# Patient Record
Sex: FEMALE | Race: WHITE | NOT HISPANIC OR LATINO | Employment: UNEMPLOYED | URBAN - METROPOLITAN AREA
[De-identification: names, ages, dates, MRNs, and addresses within clinical notes are randomized per-mention and may not be internally consistent; named-entity substitution may affect disease eponyms.]

---

## 2017-02-28 PROBLEM — Z01.419 WELL WOMAN EXAM WITH ROUTINE GYNECOLOGICAL EXAM: Chronic | Status: ACTIVE | Noted: 2017-02-28

## 2018-07-04 ENCOUNTER — APPOINTMENT (OUTPATIENT)
Dept: GENERAL RADIOLOGY | Facility: HOSPITAL | Age: 48
End: 2018-07-04

## 2018-07-04 ENCOUNTER — HOSPITAL ENCOUNTER (INPATIENT)
Facility: HOSPITAL | Age: 48
LOS: 1 days | Discharge: HOME | End: 2018-07-05
Attending: EMERGENCY MEDICINE | Admitting: ORTHOPAEDIC SURGERY

## 2018-07-04 ENCOUNTER — ANESTHESIA EVENT (OUTPATIENT)
Dept: PERIOP | Facility: HOSPITAL | Age: 48
End: 2018-07-04

## 2018-07-04 ENCOUNTER — ANESTHESIA (OUTPATIENT)
Dept: PERIOP | Facility: HOSPITAL | Age: 48
End: 2018-07-04

## 2018-07-04 DIAGNOSIS — Z74.09 IMPAIRED MOBILITY AND ADLS: ICD-10-CM

## 2018-07-04 DIAGNOSIS — S72.001A CLOSED FRACTURE OF NECK OF RIGHT FEMUR, INITIAL ENCOUNTER (HCC): Primary | ICD-10-CM

## 2018-07-04 DIAGNOSIS — Z78.9 IMPAIRED MOBILITY AND ADLS: ICD-10-CM

## 2018-07-04 DIAGNOSIS — Z74.09 IMPAIRED FUNCTIONAL MOBILITY, BALANCE, GAIT, AND ENDURANCE: ICD-10-CM

## 2018-07-04 PROBLEM — D72.829 LEUKOCYTOSIS: Status: ACTIVE | Noted: 2018-07-04

## 2018-07-04 PROBLEM — Z72.0 TOBACCO ABUSE: Status: ACTIVE | Noted: 2018-07-04

## 2018-07-04 PROBLEM — F12.90 MARIJUANA USE: Status: ACTIVE | Noted: 2018-07-04

## 2018-07-04 LAB
ABO GROUP BLD: NORMAL
ALBUMIN SERPL-MCNC: 4.6 G/DL (ref 3.2–4.8)
ALBUMIN/GLOB SERPL: 1.8 G/DL (ref 1.5–2.5)
ALP SERPL-CCNC: 77 U/L (ref 25–100)
ALT SERPL W P-5'-P-CCNC: 15 U/L (ref 7–40)
ANION GAP SERPL CALCULATED.3IONS-SCNC: 20 MMOL/L (ref 3–11)
AST SERPL-CCNC: 27 U/L (ref 0–33)
BASOPHILS # BLD AUTO: 0.03 10*3/MM3 (ref 0–0.2)
BASOPHILS NFR BLD AUTO: 0.2 % (ref 0–1)
BILIRUB SERPL-MCNC: 0.3 MG/DL (ref 0.3–1.2)
BLD GP AB SCN SERPL QL: NEGATIVE
BUN BLD-MCNC: 12 MG/DL (ref 9–23)
BUN/CREAT SERPL: 13.8 (ref 7–25)
CALCIUM SPEC-SCNC: 9.1 MG/DL (ref 8.7–10.4)
CHLORIDE SERPL-SCNC: 97 MMOL/L (ref 99–109)
CO2 SERPL-SCNC: 22 MMOL/L (ref 20–31)
CREAT BLD-MCNC: 0.87 MG/DL (ref 0.6–1.3)
DEPRECATED RDW RBC AUTO: 43.7 FL (ref 37–54)
EOSINOPHIL # BLD AUTO: 0.14 10*3/MM3 (ref 0–0.3)
EOSINOPHIL NFR BLD AUTO: 1 % (ref 0–3)
ERYTHROCYTE [DISTWIDTH] IN BLOOD BY AUTOMATED COUNT: 12.5 % (ref 11.3–14.5)
GFR SERPL CREATININE-BSD FRML MDRD: 69 ML/MIN/1.73
GLOBULIN UR ELPH-MCNC: 2.5 GM/DL
GLUCOSE BLD-MCNC: 88 MG/DL (ref 70–100)
HCG INTACT+B SERPL-ACNC: <5 MIU/ML
HCT VFR BLD AUTO: 44.2 % (ref 34.5–44)
HGB BLD-MCNC: 15 G/DL (ref 11.5–15.5)
HOLD SPECIMEN: NORMAL
HOLD SPECIMEN: NORMAL
IMM GRANULOCYTES # BLD: 0.06 10*3/MM3 (ref 0–0.03)
IMM GRANULOCYTES NFR BLD: 0.4 % (ref 0–0.6)
LYMPHOCYTES # BLD AUTO: 3.75 10*3/MM3 (ref 0.6–4.8)
LYMPHOCYTES NFR BLD AUTO: 27.4 % (ref 24–44)
MCH RBC QN AUTO: 32.7 PG (ref 27–31)
MCHC RBC AUTO-ENTMCNC: 33.9 G/DL (ref 32–36)
MCV RBC AUTO: 96.3 FL (ref 80–99)
MONOCYTES # BLD AUTO: 0.77 10*3/MM3 (ref 0–1)
MONOCYTES NFR BLD AUTO: 5.6 % (ref 0–12)
NEUTROPHILS # BLD AUTO: 8.93 10*3/MM3 (ref 1.5–8.3)
NEUTROPHILS NFR BLD AUTO: 65.4 % (ref 41–71)
PLATELET # BLD AUTO: 210 10*3/MM3 (ref 150–450)
PMV BLD AUTO: 10.3 FL (ref 6–12)
POTASSIUM BLD-SCNC: 3.8 MMOL/L (ref 3.5–5.5)
PROT SERPL-MCNC: 7.1 G/DL (ref 5.7–8.2)
RBC # BLD AUTO: 4.59 10*6/MM3 (ref 3.89–5.14)
RH BLD: POSITIVE
SODIUM BLD-SCNC: 139 MMOL/L (ref 132–146)
T&S EXPIRATION DATE: NORMAL
WBC NRBC COR # BLD: 13.68 10*3/MM3 (ref 3.5–10.8)
WHOLE BLOOD HOLD SPECIMEN: NORMAL
WHOLE BLOOD HOLD SPECIMEN: NORMAL

## 2018-07-04 PROCEDURE — C1713 ANCHOR/SCREW BN/BN,TIS/BN: HCPCS | Performed by: ORTHOPAEDIC SURGERY

## 2018-07-04 PROCEDURE — 97116 GAIT TRAINING THERAPY: CPT

## 2018-07-04 PROCEDURE — 99253 IP/OBS CNSLTJ NEW/EST LOW 45: CPT | Performed by: ORTHOPAEDIC SURGERY

## 2018-07-04 PROCEDURE — 25010000002 ONDANSETRON PER 1 MG: Performed by: ORTHOPAEDIC SURGERY

## 2018-07-04 PROCEDURE — 76000 FLUOROSCOPY <1 HR PHYS/QHP: CPT

## 2018-07-04 PROCEDURE — 25010000002 HYDROMORPHONE PER 4 MG: Performed by: EMERGENCY MEDICINE

## 2018-07-04 PROCEDURE — 86900 BLOOD TYPING SEROLOGIC ABO: CPT | Performed by: NURSE PRACTITIONER

## 2018-07-04 PROCEDURE — 25010000002 FENTANYL CITRATE (PF) 100 MCG/2ML SOLUTION: Performed by: ANESTHESIOLOGY

## 2018-07-04 PROCEDURE — 25010000003 CEFAZOLIN IN DEXTROSE 2-4 GM/100ML-% SOLUTION: Performed by: ANESTHESIOLOGY

## 2018-07-04 PROCEDURE — 25010000002 HYDROMORPHONE PER 4 MG: Performed by: INTERNAL MEDICINE

## 2018-07-04 PROCEDURE — 84702 CHORIONIC GONADOTROPIN TEST: CPT | Performed by: ORTHOPAEDIC SURGERY

## 2018-07-04 PROCEDURE — 86901 BLOOD TYPING SEROLOGIC RH(D): CPT | Performed by: NURSE PRACTITIONER

## 2018-07-04 PROCEDURE — 99406 BEHAV CHNG SMOKING 3-10 MIN: CPT | Performed by: NURSE PRACTITIONER

## 2018-07-04 PROCEDURE — 25010000002 HYDROMORPHONE PER 4 MG: Performed by: ANESTHESIOLOGY

## 2018-07-04 PROCEDURE — 99223 1ST HOSP IP/OBS HIGH 75: CPT | Performed by: INTERNAL MEDICINE

## 2018-07-04 PROCEDURE — 85025 COMPLETE CBC W/AUTO DIFF WBC: CPT | Performed by: EMERGENCY MEDICINE

## 2018-07-04 PROCEDURE — 25010000002 ONDANSETRON PER 1 MG: Performed by: EMERGENCY MEDICINE

## 2018-07-04 PROCEDURE — 80053 COMPREHEN METABOLIC PANEL: CPT | Performed by: EMERGENCY MEDICINE

## 2018-07-04 PROCEDURE — 86850 RBC ANTIBODY SCREEN: CPT | Performed by: NURSE PRACTITIONER

## 2018-07-04 PROCEDURE — 99285 EMERGENCY DEPT VISIT HI MDM: CPT

## 2018-07-04 PROCEDURE — 0QS634Z REPOSITION RIGHT UPPER FEMUR WITH INTERNAL FIXATION DEVICE, PERCUTANEOUS APPROACH: ICD-10-PCS | Performed by: ORTHOPAEDIC SURGERY

## 2018-07-04 PROCEDURE — 72170 X-RAY EXAM OF PELVIS: CPT

## 2018-07-04 PROCEDURE — 99406 BEHAV CHNG SMOKING 3-10 MIN: CPT | Performed by: ORTHOPAEDIC SURGERY

## 2018-07-04 PROCEDURE — 27235 TREAT THIGH FRACTURE: CPT | Performed by: ORTHOPAEDIC SURGERY

## 2018-07-04 PROCEDURE — C1769 GUIDE WIRE: HCPCS | Performed by: ORTHOPAEDIC SURGERY

## 2018-07-04 PROCEDURE — 71045 X-RAY EXAM CHEST 1 VIEW: CPT

## 2018-07-04 PROCEDURE — 25010000002 DIPHENHYDRAMINE PER 50 MG: Performed by: HOSPITALIST

## 2018-07-04 PROCEDURE — 97161 PT EVAL LOW COMPLEX 20 MIN: CPT

## 2018-07-04 PROCEDURE — 25010000002 PROPOFOL 10 MG/ML EMULSION: Performed by: ANESTHESIOLOGY

## 2018-07-04 PROCEDURE — 73552 X-RAY EXAM OF FEMUR 2/>: CPT

## 2018-07-04 PROCEDURE — 25010000002 ENOXAPARIN PER 10 MG: Performed by: ORTHOPAEDIC SURGERY

## 2018-07-04 DEVICE — SCRW CANN 16THRD 6.5X75MM: Type: IMPLANTABLE DEVICE | Status: FUNCTIONAL

## 2018-07-04 DEVICE — SCRW CANN 16THRD 6.5X80MM: Type: IMPLANTABLE DEVICE | Status: FUNCTIONAL

## 2018-07-04 RX ORDER — HYDROCODONE BITARTRATE AND ACETAMINOPHEN 5; 325 MG/1; MG/1
1 TABLET ORAL EVERY 4 HOURS PRN
Status: DISCONTINUED | OUTPATIENT
Start: 2018-07-04 | End: 2018-07-05

## 2018-07-04 RX ORDER — SODIUM CHLORIDE 9 MG/ML
75 INJECTION, SOLUTION INTRAVENOUS CONTINUOUS
Status: DISCONTINUED | OUTPATIENT
Start: 2018-07-04 | End: 2018-07-04 | Stop reason: SDUPTHER

## 2018-07-04 RX ORDER — NALOXONE HCL 0.4 MG/ML
0.4 VIAL (ML) INJECTION
Status: DISCONTINUED | OUTPATIENT
Start: 2018-07-04 | End: 2018-07-05 | Stop reason: HOSPADM

## 2018-07-04 RX ORDER — FAMOTIDINE 20 MG/1
20 TABLET, FILM COATED ORAL ONCE
Status: DISCONTINUED | OUTPATIENT
Start: 2018-07-04 | End: 2018-07-04 | Stop reason: SDUPTHER

## 2018-07-04 RX ORDER — FENTANYL CITRATE 50 UG/ML
50 INJECTION, SOLUTION INTRAMUSCULAR; INTRAVENOUS
Status: DISCONTINUED | OUTPATIENT
Start: 2018-07-04 | End: 2018-07-04 | Stop reason: HOSPADM

## 2018-07-04 RX ORDER — NICOTINE 21 MG/24HR
1 PATCH, TRANSDERMAL 24 HOURS TRANSDERMAL EVERY 24 HOURS
Status: DISCONTINUED | OUTPATIENT
Start: 2018-07-04 | End: 2018-07-05 | Stop reason: HOSPADM

## 2018-07-04 RX ORDER — SODIUM CHLORIDE 9 MG/ML
120 INJECTION, SOLUTION INTRAVENOUS CONTINUOUS
Status: DISCONTINUED | OUTPATIENT
Start: 2018-07-04 | End: 2018-07-05 | Stop reason: HOSPADM

## 2018-07-04 RX ORDER — DOCUSATE SODIUM 100 MG/1
100 CAPSULE, LIQUID FILLED ORAL 2 TIMES DAILY
Status: DISCONTINUED | OUTPATIENT
Start: 2018-07-04 | End: 2018-07-05 | Stop reason: HOSPADM

## 2018-07-04 RX ORDER — SODIUM CHLORIDE 0.9 % (FLUSH) 0.9 %
10 SYRINGE (ML) INJECTION AS NEEDED
Status: DISCONTINUED | OUTPATIENT
Start: 2018-07-04 | End: 2018-07-04

## 2018-07-04 RX ORDER — ONDANSETRON 2 MG/ML
4 INJECTION INTRAMUSCULAR; INTRAVENOUS EVERY 6 HOURS PRN
Status: DISCONTINUED | OUTPATIENT
Start: 2018-07-04 | End: 2018-07-05 | Stop reason: HOSPADM

## 2018-07-04 RX ORDER — DIPHENHYDRAMINE HYDROCHLORIDE 50 MG/ML
25 INJECTION INTRAMUSCULAR; INTRAVENOUS EVERY 6 HOURS PRN
Status: DISCONTINUED | OUTPATIENT
Start: 2018-07-04 | End: 2018-07-05 | Stop reason: HOSPADM

## 2018-07-04 RX ORDER — LIDOCAINE HYDROCHLORIDE 10 MG/ML
0.5 INJECTION, SOLUTION EPIDURAL; INFILTRATION; INTRACAUDAL; PERINEURAL ONCE AS NEEDED
Status: DISCONTINUED | OUTPATIENT
Start: 2018-07-04 | End: 2018-07-05 | Stop reason: HOSPADM

## 2018-07-04 RX ORDER — ATRACURIUM BESYLATE 10 MG/ML
INJECTION, SOLUTION INTRAVENOUS AS NEEDED
Status: DISCONTINUED | OUTPATIENT
Start: 2018-07-04 | End: 2018-07-04 | Stop reason: SURG

## 2018-07-04 RX ORDER — BISACODYL 5 MG/1
5 TABLET, DELAYED RELEASE ORAL DAILY PRN
Status: DISCONTINUED | OUTPATIENT
Start: 2018-07-04 | End: 2018-07-05 | Stop reason: HOSPADM

## 2018-07-04 RX ORDER — MAGNESIUM HYDROXIDE 1200 MG/15ML
LIQUID ORAL AS NEEDED
Status: DISCONTINUED | OUTPATIENT
Start: 2018-07-04 | End: 2018-07-04 | Stop reason: HOSPADM

## 2018-07-04 RX ORDER — ONDANSETRON 2 MG/ML
4 INJECTION INTRAMUSCULAR; INTRAVENOUS ONCE
Status: COMPLETED | OUTPATIENT
Start: 2018-07-04 | End: 2018-07-04

## 2018-07-04 RX ORDER — SODIUM CHLORIDE, SODIUM LACTATE, POTASSIUM CHLORIDE, CALCIUM CHLORIDE 600; 310; 30; 20 MG/100ML; MG/100ML; MG/100ML; MG/100ML
INJECTION, SOLUTION INTRAVENOUS CONTINUOUS PRN
Status: DISCONTINUED | OUTPATIENT
Start: 2018-07-04 | End: 2018-07-04 | Stop reason: SURG

## 2018-07-04 RX ORDER — CEFAZOLIN SODIUM 2 G/100ML
INJECTION, SOLUTION INTRAVENOUS AS NEEDED
Status: DISCONTINUED | OUTPATIENT
Start: 2018-07-04 | End: 2018-07-04 | Stop reason: SURG

## 2018-07-04 RX ORDER — FAMOTIDINE 10 MG/ML
20 INJECTION, SOLUTION INTRAVENOUS ONCE
Status: DISCONTINUED | OUTPATIENT
Start: 2018-07-04 | End: 2018-07-04 | Stop reason: SDUPTHER

## 2018-07-04 RX ORDER — PROPOFOL 10 MG/ML
VIAL (ML) INTRAVENOUS AS NEEDED
Status: DISCONTINUED | OUTPATIENT
Start: 2018-07-04 | End: 2018-07-04 | Stop reason: SURG

## 2018-07-04 RX ORDER — SODIUM CHLORIDE 0.9 % (FLUSH) 0.9 %
1-10 SYRINGE (ML) INJECTION AS NEEDED
Status: DISCONTINUED | OUTPATIENT
Start: 2018-07-04 | End: 2018-07-04 | Stop reason: SDUPTHER

## 2018-07-04 RX ORDER — SODIUM CHLORIDE 0.9 % (FLUSH) 0.9 %
1-10 SYRINGE (ML) INJECTION AS NEEDED
Status: DISCONTINUED | OUTPATIENT
Start: 2018-07-04 | End: 2018-07-04

## 2018-07-04 RX ORDER — SODIUM CHLORIDE, SODIUM LACTATE, POTASSIUM CHLORIDE, CALCIUM CHLORIDE 600; 310; 30; 20 MG/100ML; MG/100ML; MG/100ML; MG/100ML
9 INJECTION, SOLUTION INTRAVENOUS CONTINUOUS
Status: DISCONTINUED | OUTPATIENT
Start: 2018-07-04 | End: 2018-07-04 | Stop reason: SDUPTHER

## 2018-07-04 RX ORDER — BISACODYL 10 MG
10 SUPPOSITORY, RECTAL RECTAL DAILY PRN
Status: DISCONTINUED | OUTPATIENT
Start: 2018-07-04 | End: 2018-07-05 | Stop reason: HOSPADM

## 2018-07-04 RX ORDER — SODIUM CHLORIDE 0.9 % (FLUSH) 0.9 %
10 SYRINGE (ML) INJECTION AS NEEDED
Status: DISCONTINUED | OUTPATIENT
Start: 2018-07-04 | End: 2018-07-04 | Stop reason: SDUPTHER

## 2018-07-04 RX ORDER — MORPHINE SULFATE 4 MG/ML
1 INJECTION, SOLUTION INTRAMUSCULAR; INTRAVENOUS EVERY 4 HOURS PRN
Status: DISCONTINUED | OUTPATIENT
Start: 2018-07-04 | End: 2018-07-05 | Stop reason: HOSPADM

## 2018-07-04 RX ORDER — FENTANYL CITRATE 50 UG/ML
INJECTION, SOLUTION INTRAMUSCULAR; INTRAVENOUS AS NEEDED
Status: DISCONTINUED | OUTPATIENT
Start: 2018-07-04 | End: 2018-07-04 | Stop reason: SURG

## 2018-07-04 RX ORDER — ACETAMINOPHEN 325 MG/1
650 TABLET ORAL EVERY 6 HOURS PRN
Status: DISCONTINUED | OUTPATIENT
Start: 2018-07-04 | End: 2018-07-05 | Stop reason: HOSPADM

## 2018-07-04 RX ORDER — SODIUM CHLORIDE 9 MG/ML
125 INJECTION, SOLUTION INTRAVENOUS CONTINUOUS
Status: DISCONTINUED | OUTPATIENT
Start: 2018-07-04 | End: 2018-07-04 | Stop reason: SDUPTHER

## 2018-07-04 RX ADMIN — SODIUM CHLORIDE, POTASSIUM CHLORIDE, SODIUM LACTATE AND CALCIUM CHLORIDE: 600; 310; 30; 20 INJECTION, SOLUTION INTRAVENOUS at 13:13

## 2018-07-04 RX ADMIN — HYDROMORPHONE HYDROCHLORIDE 0.5 MG: 1 INJECTION, SOLUTION INTRAMUSCULAR; INTRAVENOUS; SUBCUTANEOUS at 09:42

## 2018-07-04 RX ADMIN — CEFAZOLIN SODIUM 2 G: 2 INJECTION, SOLUTION INTRAVENOUS at 12:24

## 2018-07-04 RX ADMIN — DIPHENHYDRAMINE HYDROCHLORIDE 25 MG: 50 INJECTION INTRAMUSCULAR; INTRAVENOUS at 21:12

## 2018-07-04 RX ADMIN — HYDROMORPHONE HYDROCHLORIDE 0.5 MG: 1 INJECTION, SOLUTION INTRAMUSCULAR; INTRAVENOUS; SUBCUTANEOUS at 13:20

## 2018-07-04 RX ADMIN — HYDROMORPHONE HYDROCHLORIDE 0.5 MG: 1 INJECTION, SOLUTION INTRAMUSCULAR; INTRAVENOUS; SUBCUTANEOUS at 23:30

## 2018-07-04 RX ADMIN — SODIUM CHLORIDE 120 ML/HR: 9 INJECTION, SOLUTION INTRAVENOUS at 14:16

## 2018-07-04 RX ADMIN — ONDANSETRON 4 MG: 2 INJECTION, SOLUTION INTRAMUSCULAR; INTRAVENOUS at 01:36

## 2018-07-04 RX ADMIN — HYDROCODONE BITARTRATE AND ACETAMINOPHEN 1 TABLET: 5; 325 TABLET ORAL at 21:48

## 2018-07-04 RX ADMIN — SODIUM CHLORIDE 75 ML/HR: 9 INJECTION, SOLUTION INTRAVENOUS at 05:47

## 2018-07-04 RX ADMIN — HYDROCODONE BITARTRATE AND ACETAMINOPHEN 1 TABLET: 5; 325 TABLET ORAL at 18:02

## 2018-07-04 RX ADMIN — NICOTINE 1 PATCH: 21 PATCH, EXTENDED RELEASE TRANSDERMAL at 05:46

## 2018-07-04 RX ADMIN — ONDANSETRON 4 MG: 2 INJECTION INTRAMUSCULAR; INTRAVENOUS at 14:18

## 2018-07-04 RX ADMIN — DOCUSATE SODIUM 100 MG: 100 CAPSULE, LIQUID FILLED ORAL at 21:48

## 2018-07-04 RX ADMIN — FENTANYL CITRATE 50 MCG: 50 INJECTION, SOLUTION INTRAMUSCULAR; INTRAVENOUS at 13:30

## 2018-07-04 RX ADMIN — FENTANYL CITRATE 50 MCG: 50 INJECTION, SOLUTION INTRAMUSCULAR; INTRAVENOUS at 13:35

## 2018-07-04 RX ADMIN — ATRACURIUM BESYLATE 40 MG: 10 INJECTION, SOLUTION INTRAVENOUS at 12:16

## 2018-07-04 RX ADMIN — PROPOFOL 200 MG: 10 INJECTION, EMULSION INTRAVENOUS at 12:16

## 2018-07-04 RX ADMIN — ONDANSETRON 4 MG: 2 INJECTION INTRAMUSCULAR; INTRAVENOUS at 21:48

## 2018-07-04 RX ADMIN — HYDROMORPHONE HYDROCHLORIDE 1 MG: 1 INJECTION, SOLUTION INTRAMUSCULAR; INTRAVENOUS; SUBCUTANEOUS at 04:42

## 2018-07-04 RX ADMIN — HYDROMORPHONE HYDROCHLORIDE 1 MG: 1 INJECTION, SOLUTION INTRAMUSCULAR; INTRAVENOUS; SUBCUTANEOUS at 01:36

## 2018-07-04 RX ADMIN — DIPHENHYDRAMINE HYDROCHLORIDE 25 MG: 50 INJECTION INTRAMUSCULAR; INTRAVENOUS at 15:03

## 2018-07-04 RX ADMIN — DIPHENHYDRAMINE HYDROCHLORIDE 25 MG: 50 INJECTION INTRAMUSCULAR; INTRAVENOUS at 08:11

## 2018-07-04 RX ADMIN — HYDROMORPHONE HYDROCHLORIDE 0.5 MG: 1 INJECTION, SOLUTION INTRAMUSCULAR; INTRAVENOUS; SUBCUTANEOUS at 13:25

## 2018-07-04 RX ADMIN — HYDROCODONE BITARTRATE AND ACETAMINOPHEN 1 TABLET: 5; 325 TABLET ORAL at 14:16

## 2018-07-04 RX ADMIN — FENTANYL CITRATE 250 MCG: 50 INJECTION, SOLUTION INTRAMUSCULAR; INTRAVENOUS at 12:16

## 2018-07-04 RX ADMIN — ENOXAPARIN SODIUM 40 MG: 40 INJECTION SUBCUTANEOUS at 17:23

## 2018-07-04 NOTE — ANESTHESIA PROCEDURE NOTES
Airway  Urgency: elective    Airway not difficult    General Information and Staff    Patient location during procedure: OR  Anesthesiologist: STEVEN LI    Indications and Patient Condition  Indications for airway management: airway protection    Preoxygenated: yes  MILS not maintained throughout  Mask difficulty assessment: 1 - vent by mask    Final Airway Details  Final airway type: endotracheal airway      Successful airway: ETT  Cuffed: yes   Successful intubation technique: direct laryngoscopy  Endotracheal tube insertion site: oral  Blade: Yasmany  Blade size: #3  ETT size: 6.5 mm  Cormack-Lehane Classification: grade I - full view of glottis  Placement verified by: chest auscultation and capnometry   Measured from: lips  ETT to lips (cm): 20  Number of attempts at approach: 1    Additional Comments  Negative epigastric sounds, Breath sound equal bilaterally with symmetric chest rise and fall

## 2018-07-04 NOTE — ANESTHESIA POSTPROCEDURE EVALUATION
Patient: Anthony Chau    Procedure Summary     Date:  07/04/18 Room / [facility]:   [facility]    Anesthesia Start:  1210 Anesthesia Stop:      Procedure:  HIP TROCANTERIC NAILING WITH INTRAMEDULLARY HIP SCREW (Right Hip) Diagnosis:      Surgeon:  Alejandro Hernandez MD Provider:  Iron Martin MD    Anesthesia Type:  general ASA Status:  2 - Emergent          Anesthesia Type: general  Last vitals  BP   139/71 (07/04/18 0736)   Temp   98.3 °F (36.8 °C) (07/04/18 0736)   Pulse   73 (07/04/18 1154)   Resp   15 (07/04/18 1154)     SpO2   99 % (07/04/18 1154)     Post Anesthesia Care and Evaluation    Patient location during evaluation: PACU  Patient participation: complete - patient participated  Level of consciousness: awake and alert  Pain score: 0  Pain management: adequate  Airway patency: patent  Anesthetic complications: No anesthetic complications  PONV Status: none  Cardiovascular status: hemodynamically stable and acceptable  Respiratory status: nonlabored ventilation, acceptable and nasal cannula  Hydration status: acceptable

## 2018-07-04 NOTE — ANESTHESIA PREPROCEDURE EVALUATION
Anesthesia Evaluation                  Airway   Mallampati: I  TM distance: >3 FB  Neck ROM: full  No difficulty expected  Dental      Pulmonary    (+) a smoker,   Cardiovascular         Neuro/Psych  GI/Hepatic/Renal/Endo      Musculoskeletal     Abdominal    Substance History   (+) drug use     OB/GYN          Other                        Anesthesia Plan    ASA 2 - emergent     general     intravenous induction   Anesthetic plan and risks discussed with patient.

## 2018-07-05 VITALS
TEMPERATURE: 97.6 F | WEIGHT: 120 LBS | BODY MASS INDEX: 22.08 KG/M2 | HEART RATE: 89 BPM | SYSTOLIC BLOOD PRESSURE: 157 MMHG | HEIGHT: 62 IN | OXYGEN SATURATION: 98 % | DIASTOLIC BLOOD PRESSURE: 83 MMHG | RESPIRATION RATE: 14 BRPM

## 2018-07-05 LAB
ANION GAP SERPL CALCULATED.3IONS-SCNC: 7 MMOL/L (ref 3–11)
BASOPHILS # BLD AUTO: 0.02 10*3/MM3 (ref 0–0.2)
BASOPHILS NFR BLD AUTO: 0.2 % (ref 0–1)
BUN BLD-MCNC: 9 MG/DL (ref 9–23)
BUN/CREAT SERPL: 11 (ref 7–25)
CALCIUM SPEC-SCNC: 8.2 MG/DL (ref 8.7–10.4)
CHLORIDE SERPL-SCNC: 106 MMOL/L (ref 99–109)
CO2 SERPL-SCNC: 27 MMOL/L (ref 20–31)
CREAT BLD-MCNC: 0.82 MG/DL (ref 0.6–1.3)
DEPRECATED RDW RBC AUTO: 43.9 FL (ref 37–54)
EOSINOPHIL # BLD AUTO: 0.06 10*3/MM3 (ref 0–0.3)
EOSINOPHIL NFR BLD AUTO: 0.6 % (ref 0–3)
ERYTHROCYTE [DISTWIDTH] IN BLOOD BY AUTOMATED COUNT: 12.5 % (ref 11.3–14.5)
GFR SERPL CREATININE-BSD FRML MDRD: 74 ML/MIN/1.73
GLUCOSE BLD-MCNC: 98 MG/DL (ref 70–100)
HCT VFR BLD AUTO: 39.7 % (ref 34.5–44)
HGB BLD-MCNC: 13.4 G/DL (ref 11.5–15.5)
IMM GRANULOCYTES # BLD: 0.02 10*3/MM3 (ref 0–0.03)
IMM GRANULOCYTES NFR BLD: 0.2 % (ref 0–0.6)
LYMPHOCYTES # BLD AUTO: 3.02 10*3/MM3 (ref 0.6–4.8)
LYMPHOCYTES NFR BLD AUTO: 28.8 % (ref 24–44)
MCH RBC QN AUTO: 32.6 PG (ref 27–31)
MCHC RBC AUTO-ENTMCNC: 33.8 G/DL (ref 32–36)
MCV RBC AUTO: 96.6 FL (ref 80–99)
MONOCYTES # BLD AUTO: 0.89 10*3/MM3 (ref 0–1)
MONOCYTES NFR BLD AUTO: 8.5 % (ref 0–12)
NEUTROPHILS # BLD AUTO: 6.46 10*3/MM3 (ref 1.5–8.3)
NEUTROPHILS NFR BLD AUTO: 61.7 % (ref 41–71)
PLATELET # BLD AUTO: 166 10*3/MM3 (ref 150–450)
PMV BLD AUTO: 10.7 FL (ref 6–12)
POTASSIUM BLD-SCNC: 3.7 MMOL/L (ref 3.5–5.5)
RBC # BLD AUTO: 4.11 10*6/MM3 (ref 3.89–5.14)
SODIUM BLD-SCNC: 140 MMOL/L (ref 132–146)
WBC NRBC COR # BLD: 10.47 10*3/MM3 (ref 3.5–10.8)

## 2018-07-05 PROCEDURE — 97535 SELF CARE MNGMENT TRAINING: CPT | Performed by: OCCUPATIONAL THERAPIST

## 2018-07-05 PROCEDURE — 99024 POSTOP FOLLOW-UP VISIT: CPT | Performed by: ORTHOPAEDIC SURGERY

## 2018-07-05 PROCEDURE — 97116 GAIT TRAINING THERAPY: CPT

## 2018-07-05 PROCEDURE — 25010000002 ENOXAPARIN PER 10 MG: Performed by: ORTHOPAEDIC SURGERY

## 2018-07-05 PROCEDURE — 25010000002 HYDROMORPHONE PER 4 MG: Performed by: INTERNAL MEDICINE

## 2018-07-05 PROCEDURE — 97165 OT EVAL LOW COMPLEX 30 MIN: CPT | Performed by: OCCUPATIONAL THERAPIST

## 2018-07-05 PROCEDURE — 25010000002 DIPHENHYDRAMINE PER 50 MG: Performed by: HOSPITALIST

## 2018-07-05 PROCEDURE — 85025 COMPLETE CBC W/AUTO DIFF WBC: CPT | Performed by: ORTHOPAEDIC SURGERY

## 2018-07-05 PROCEDURE — 99239 HOSP IP/OBS DSCHRG MGMT >30: CPT | Performed by: HOSPITALIST

## 2018-07-05 PROCEDURE — 97110 THERAPEUTIC EXERCISES: CPT

## 2018-07-05 PROCEDURE — 80048 BASIC METABOLIC PNL TOTAL CA: CPT | Performed by: HOSPITALIST

## 2018-07-05 PROCEDURE — 25010000002 ONDANSETRON PER 1 MG: Performed by: ORTHOPAEDIC SURGERY

## 2018-07-05 RX ORDER — OXYCODONE HYDROCHLORIDE AND ACETAMINOPHEN 5; 325 MG/1; MG/1
1 TABLET ORAL EVERY 4 HOURS PRN
Qty: 18 TABLET | Refills: 0 | Status: SHIPPED | OUTPATIENT
Start: 2018-07-05 | End: 2018-07-08

## 2018-07-05 RX ORDER — ASPIRIN 325 MG
325 TABLET, DELAYED RELEASE (ENTERIC COATED) ORAL DAILY
Qty: 28 TABLET | Refills: 0 | Status: SHIPPED | OUTPATIENT
Start: 2018-07-20 | End: 2018-08-10 | Stop reason: SDUPTHER

## 2018-07-05 RX ORDER — OXYCODONE HYDROCHLORIDE AND ACETAMINOPHEN 5; 325 MG/1; MG/1
1 TABLET ORAL EVERY 4 HOURS PRN
Status: DISCONTINUED | OUTPATIENT
Start: 2018-07-05 | End: 2018-07-05 | Stop reason: HOSPADM

## 2018-07-05 RX ADMIN — DOCUSATE SODIUM 100 MG: 100 CAPSULE, LIQUID FILLED ORAL at 09:37

## 2018-07-05 RX ADMIN — DIPHENHYDRAMINE HYDROCHLORIDE 25 MG: 50 INJECTION INTRAMUSCULAR; INTRAVENOUS at 05:31

## 2018-07-05 RX ADMIN — HYDROCODONE BITARTRATE AND ACETAMINOPHEN 1 TABLET: 5; 325 TABLET ORAL at 06:50

## 2018-07-05 RX ADMIN — HYDROMORPHONE HYDROCHLORIDE 0.5 MG: 1 INJECTION, SOLUTION INTRAMUSCULAR; INTRAVENOUS; SUBCUTANEOUS at 05:30

## 2018-07-05 RX ADMIN — HYDROCODONE BITARTRATE AND ACETAMINOPHEN 1 TABLET: 5; 325 TABLET ORAL at 01:53

## 2018-07-05 RX ADMIN — ENOXAPARIN SODIUM 40 MG: 40 INJECTION SUBCUTANEOUS at 05:31

## 2018-07-05 RX ADMIN — ACETAMINOPHEN 650 MG: 325 TABLET, FILM COATED ORAL at 06:50

## 2018-07-05 RX ADMIN — ONDANSETRON 4 MG: 2 INJECTION INTRAMUSCULAR; INTRAVENOUS at 05:31

## 2018-07-05 RX ADMIN — NICOTINE 1 PATCH: 21 PATCH, EXTENDED RELEASE TRANSDERMAL at 05:32

## 2018-07-05 RX ADMIN — OXYCODONE HYDROCHLORIDE AND ACETAMINOPHEN 1 TABLET: 5; 325 TABLET ORAL at 13:34

## 2018-07-09 ENCOUNTER — TELEPHONE (OUTPATIENT)
Dept: ORTHOPEDIC SURGERY | Facility: CLINIC | Age: 48
End: 2018-07-09

## 2018-07-09 ENCOUNTER — TRANSITIONAL CARE MANAGEMENT TELEPHONE ENCOUNTER (OUTPATIENT)
Dept: FAMILY MEDICINE CLINIC | Facility: CLINIC | Age: 48
End: 2018-07-09

## 2018-07-09 ENCOUNTER — APPOINTMENT (OUTPATIENT)
Dept: PHYSICAL THERAPY | Facility: HOSPITAL | Age: 48
End: 2018-07-09

## 2018-07-09 NOTE — OUTREACH NOTE
The patient was discharged from [facility] 7/5/18 and has a New Patient hospital follow up scheduled with a new PCP Hollie Chapa which is within 2 business days of hospital discharge.

## 2018-07-10 ENCOUNTER — OFFICE VISIT (OUTPATIENT)
Dept: FAMILY MEDICINE CLINIC | Facility: CLINIC | Age: 48
End: 2018-07-10

## 2018-07-10 VITALS
DIASTOLIC BLOOD PRESSURE: 94 MMHG | HEART RATE: 92 BPM | OXYGEN SATURATION: 97 % | RESPIRATION RATE: 16 BRPM | TEMPERATURE: 98.7 F | SYSTOLIC BLOOD PRESSURE: 136 MMHG

## 2018-07-10 DIAGNOSIS — F41.9 ANXIETY: ICD-10-CM

## 2018-07-10 DIAGNOSIS — F17.210 CIGARETTE NICOTINE DEPENDENCE WITHOUT COMPLICATION: ICD-10-CM

## 2018-07-10 DIAGNOSIS — Z86.718 H/O BLOOD CLOTS: ICD-10-CM

## 2018-07-10 DIAGNOSIS — S72.001S CLOSED FRACTURE OF NECK OF RIGHT FEMUR, SEQUELA: Primary | ICD-10-CM

## 2018-07-10 PROCEDURE — 99496 TRANSJ CARE MGMT HIGH F2F 7D: CPT | Performed by: NURSE PRACTITIONER

## 2018-07-10 RX ORDER — NICOTINE 21 MG/24HR
1 PATCH, TRANSDERMAL 24 HOURS TRANSDERMAL EVERY 24 HOURS
Qty: 30 PATCH | Refills: 1 | Status: SHIPPED | OUTPATIENT
Start: 2018-07-10 | End: 2019-03-26

## 2018-07-10 NOTE — PROGRESS NOTES
Transitional Care Follow Up Visit  Subjective     Anthony Chau is a 48 y.o. female who presents for a transitional care management visit.    Within 48 business hours after discharge our office contacted her via telephone to coordinate her care and needs.      I reviewed and discussed the details of that call along with the discharge summary, hospital problems, inpatient lab results, inpatient diagnostic studies, and consultation reports with Anthony.     Current outpatient and discharge medications have been reconciled for the patient.    Date of TCM Phone Call 7/9/2018   [facility]   Date of Admission 7/4/2018   Date of Discharge 7/5/2018   Discharge Disposition Home or Self Care     Risk for Readmission (LACE) Score: 4 (7/5/2018  6:00 AM)    History of Present Illness   Patient is here for SUZY visit and to establish care. Has not had a PCP in a while, she was seeing someone at [facility].    Course During Hospital Stay:  Patient was admitted after a fall that resulted in a right femur fracture, Dr. Hernandez, orthopedic surgeon performed surgery with right hip pinning.   Patient discharged 7/5/18. States she will start PT tomorrow. She is still doing Lovenox injections, has not been wearing the Compression stockings.   Is using her walker , had first bowel movement yesterday, is taking her stool softener. Is using IS some. Will see ortho in 10 days. Patient is a smoker, but is interested in trying patches.   The following portions of the patient's history were reviewed and updated as appropriate: allergies, current medications, past family history, past medical history, past social history, past surgical history and problem list.    Review of Systems   Constitutional: Positive for activity change. Negative for chills and fever.   HENT: Negative for congestion.    Respiratory: Positive for cough and shortness of breath (with anxiety). Negative for wheezing.    Cardiovascular: Positive  for palpitations (with anxiety). Negative for chest pain.   Gastrointestinal: Positive for constipation. Negative for diarrhea.   Musculoskeletal: Positive for arthralgias and myalgias. Negative for gait problem and joint swelling.   Skin: Negative.    Neurological: Negative for weakness and numbness.   Psychiatric/Behavioral: The patient is nervous/anxious.        Objective   Physical Exam   Constitutional: She appears well-developed and well-nourished. No distress.   HENT:   Head: Normocephalic and atraumatic.   Right Ear: External ear normal.   Left Ear: External ear normal.   Cardiovascular: Normal rate, regular rhythm and normal heart sounds.    No murmur heard.  Pulmonary/Chest: Effort normal and breath sounds normal. No respiratory distress. She has no wheezes. She has no rales. She exhibits no tenderness.   Abdominal: Soft. She exhibits no distension.   Musculoskeletal: She exhibits tenderness (right hip/lateral thigh).   Skin: Skin is warm and dry. Capillary refill takes less than 2 seconds. She is not diaphoretic.   Bruising, slight edema, no drainage   Psychiatric: She has a normal mood and affect. Her behavior is normal. Judgment and thought content normal.   Vitals reviewed.      Assessment/Plan   Anthony was seen today for hospital follow-up.    Diagnoses and all orders for this visit:    Closed fracture of neck of right femur, sequela  -     DEXA Bone Density Axial; Future    Cigarette nicotine dependence without complication  -     nicotine (NICODERM CQ) 21 MG/24HR patch; Place 1 patch on the skin Daily.  -     DEXA Bone Density Axial; Future    Anxiety    H/O blood clots      Follow up with orthopedic as scheduled, discussed signs and symptoms of infection to watch for. Encouraged use of walker, non weight bearing as instructed. DEXA scan ordered to evaluate for osteoporosis.  Risks/benefits and potential side effects of various smoking cessation treatment options reviewed with patient.  Smoking  "cessation support options also reviewed with patient.  \"Beat the Pack\" brochure provided and reviewed with patient.  Patient voiced understanding and wishes to proceed with nicotine patches.  A total of 4 minutes dedicated to smoking cessation counseling during this visit. Discussed the importance of smoking cessation for adequate bone healing.   Patient to follow up for anxiety issues, stable.  Continue lovenox for clot prevention, wear compression stockings during the day. Participate in physical therapy.  Patient was encouraged to keep me informed of any acute changes, lack of improvement, or any new concerning symptoms.Patient voiced understanding of all instructions and denied further questions.             "

## 2018-07-10 NOTE — PATIENT INSTRUCTIONS
Preventing Constipation After Surgery  Constipation is when a person has fewer than 3 bowel movements a week; has difficulty having a bowel movement; or has stools that are dry, hard, or larger than normal. Many things can make constipation likely after surgery. They include:  · Medicines, especially numbing medicines (anesthetics) and very strong pain medicines called narcotics.  · Feeling stressed because of the surgery.  · Eating different foods than normal.  · Being less active.    Symptoms of constipation include:  · Having fewer than 3 bowel movements a week.  · Straining to have a bowel movement.  · Having hard, dry, or larger-than-normal stools.  · Feeling full or bloated.  · Having pain in the lower abdomen.  · Not feeling relief after having a bowel movement.    Follow these instructions at home:  Diet  · Eat foods that have a lot of fiber. These include fruits, vegetables, whole grains, and beans. Limit foods high in fat and processed sugars. These include french fries, hamburgers, cookies, and candy.  · Take a fiber supplement as directed. If you are not taking a fiber supplement and think that you are not getting enough fiber from foods, talk to your health care provider about adding a fiber supplement to your diet.  · Drink clear fluids, especially water. Avoid drinking alcohol, caffeine, and soda. These can make constipation worse.  · Drink enough fluids to keep your urine clear or pale yellow.  Activity  · After surgery, return to your normal activities slowly or when your health care provider says it is okay.  · Start walking as soon as you can. Try to go a little farther each day.  · Once your health care provider approves, do some sort of regular exercise. This helps prevent constipation.  Bowel Movements  · Go to the restroom when you have the urge to go. Do not hold it in.  · Try drinking something hot to get a bowel movement started.  · Keep track of how often you use the restroom. If you miss  2-3 bowel movements, talk to your health care provider about medicines that prevent constipation. Your health care provider may suggest a stool softener, laxative, or fiber supplement.  · Only take over-the-counter or prescription medicines as directed by your health care provider.  · Do not take other medicines without talking to your health care provider first. If you become constipated and take a medicine to make you have a bowel movement, the problem may get worse. Other kinds of medicine can also make the problem worse.  Contact a health care provider if:  · You used stool softeners or laxatives and still have not had a bowel movement within 24-48 hours after using them.  · You have not had a bowel movement in 3 days.  Get help right away if:  · Your constipation lasts for more than 4 days or gets worse.  · You have bright red blood in your stool.  · You have abdominal or rectal pain.  · You have very bad cramping.  · You have thin, pencil-like stools.  · You have unexplained weight loss.  · You have a fever or persistent symptoms for more than 2-3 days.  · You have a fever and your symptoms suddenly get worse.  This information is not intended to replace advice given to you by your health care provider. Make sure you discuss any questions you have with your health care provider.  Document Released: 04/14/2014 Document Revised: 05/25/2017 Document Reviewed: 01/31/2014  [employer] Interactive Patient Education © 2018 [employer]    For more information:    Quit Now [place]  1-800-QUIT-NOW  https://[place].quitlogix.org/en-[place]/  Steps to Quit Smoking  Smoking tobacco can be harmful to your health and can affect almost every organ in your body. Smoking puts you, and those around you, at risk for developing many serious chronic diseases. Quitting smoking is difficult, but it is one of the best things that you can do for your health. It is never too late to quit.  What are the benefits of quitting smoking?  When  you quit smoking, you lower your risk of developing serious diseases and conditions, such as:  · Lung cancer or lung disease, such as COPD.  · Heart disease.  · Stroke.  · Heart attack.  · Infertility.  · Osteoporosis and bone fractures.  Additionally, symptoms such as coughing, wheezing, and shortness of breath may get better when you quit. You may also find that you get sick less often because your body is stronger at fighting off colds and infections. If you are pregnant, quitting smoking can help to reduce your chances of having a baby of low birth weight.  How do I get ready to quit?  When you decide to quit smoking, create a plan to make sure that you are successful. Before you quit:  · Pick a date to quit. Set a date within the next two weeks to give you time to prepare.  · Write down the reasons why you are quitting. Keep this list in places where you will see it often, such as on your bathroom mirror or in your car or wallet.  · Identify the people, places, things, and activities that make you want to smoke (triggers) and avoid them. Make sure to take these actions:  ¨ Throw away all cigarettes at home, at work, and in your car.  ¨ Throw away smoking accessories, such as ashtrays and lighters.  ¨ Clean your car and make sure to empty the ashtray.  ¨ Clean your home, including curtains and carpets.  · Tell your family, friends, and coworkers that you are quitting. Support from your loved ones can make quitting easier.  · Talk with your health care provider about your options for quitting smoking.  · Find out what treatment options are covered by your health insurance.  What strategies can I use to quit smoking?  Talk with your healthcare provider about different strategies to quit smoking. Some strategies include:  · Quitting smoking altogether instead of gradually lessening how much you smoke over a period of time. Research shows that quitting “cold turkey” is more successful than gradually  quitting.  · Attending in-person counseling to help you build problem-solving skills. You are more likely to have success in quitting if you attend several counseling sessions. Even short sessions of 10 minutes can be effective.  · Finding resources and support systems that can help you to quit smoking and remain smoke-free after you quit. These resources are most helpful when you use them often. They can include:  ¨ Online chats with a counselor.  ¨ Telephone quitlines.  ¨ Printed self-help materials.  ¨ Support groups or group counseling.  ¨ Text messaging programs.  ¨ Mobile phone applications.  · Taking medicines to help you quit smoking. (If you are pregnant or breastfeeding, talk with your health care provider first.) Some medicines contain nicotine and some do not. Both types of medicines help with cravings, but the medicines that include nicotine help to relieve withdrawal symptoms. Your health care provider may recommend:  ¨ Nicotine patches, gum, or lozenges.  ¨ Nicotine inhalers or sprays.  ¨ Non-nicotine medicine that is taken by mouth.  Talk with your health care provider about combining strategies, such as taking medicines while you are also receiving in-person counseling. Using these two strategies together makes you more likely to succeed in quitting than if you used either strategy on its own.  If you are pregnant or breastfeeding, talk with your health care provider about finding counseling or other support strategies to quit smoking. Do not take medicine to help you quit smoking unless told to do so by your health care provider.  What things can I do to make it easier to quit?  Quitting smoking might feel overwhelming at first, but there is a lot that you can do to make it easier. Take these important actions:  · Reach out to your family and friends and ask that they support and encourage you during this time. Call telephone quitlines, reach out to support groups, or work with a counselor for  support.  · Ask people who smoke to avoid smoking around you.  · Avoid places that trigger you to smoke, such as bars, parties, or smoke-break areas at work.  · Spend time around people who do not smoke.  · Lessen stress in your life, because stress can be a smoking trigger for some people. To lessen stress, try:  ¨ Exercising regularly.  ¨ Deep-breathing exercises.  ¨ Yoga.  ¨ Meditating.  ¨ Performing a body scan. This involves closing your eyes, scanning your body from head to toe, and noticing which parts of your body are particularly tense. Purposefully relax the muscles in those areas.  · Download or purchase mobile phone or tablet apps (applications) that can help you stick to your quit plan by providing reminders, tips, and encouragement. There are many free apps, such as QuitGuide from the CDC (Centers for Disease Control and Prevention). You can find other support for quitting smoking (smoking cessation) through smokefree.gov and other websites.  How will I feel when I quit smoking?  Within the first 24 hours of quitting smoking, you may start to feel some withdrawal symptoms. These symptoms are usually most noticeable 2-3 days after quitting, but they usually do not last beyond 2-3 weeks. Changes or symptoms that you might experience include:  · Mood swings.  · Restlessness, anxiety, or irritation.  · Difficulty concentrating.  · Dizziness.  · Strong cravings for sugary foods in addition to nicotine.  · Mild weight gain.  · Constipation.  · Nausea.  · Coughing or a sore throat.  · Changes in how your medicines work in your body.  · A depressed mood.  · Difficulty sleeping (insomnia).  After the first 2-3 weeks of quitting, you may start to notice more positive results, such as:  · Improved sense of smell and taste.  · Decreased coughing and sore throat.  · Slower heart rate.  · Lower blood pressure.  · Clearer skin.  · The ability to breathe more easily.  · Fewer sick days.  Quitting smoking is very  challenging for most people. Do not get discouraged if you are not successful the first time. Some people need to make many attempts to quit before they achieve long-term success. Do your best to stick to your quit plan, and talk with your health care provider if you have any questions or concerns.  This information is not intended to replace advice given to you by your health care provider. Make sure you discuss any questions you have with your health care provider.  Document Released: 12/12/2002 Document Revised: 08/15/2017 Document Reviewed: 05/03/2016  [employer] Interactive Patient Education © 2017 [employer]

## 2018-07-10 NOTE — TELEPHONE ENCOUNTER
Called patient back after speaking with Dr Ross, I instructed her to take tylenol OTC for the pain. She is going to her PCP today and will see if they may give her something stronger. She will keep the incision covered and dry and use the saran wrap for showering. She has already been doing this and will continue. She will call back with any further problems or questions.   Thi

## 2018-07-10 NOTE — TELEPHONE ENCOUNTER
--Patient needs to know about showering and when she can change her dressing. She states that she has already showered 2 times and changed  Her dressing 2 times as well.     She is concerned about how she is going to get around due to her hands and arms hurting from having to put all her weight on them. She is also concerned about what OTC medication she can take due to being on the lovenox injections as well.     [phone]

## 2018-07-11 ENCOUNTER — HOSPITAL ENCOUNTER (OUTPATIENT)
Dept: PHYSICAL THERAPY | Facility: HOSPITAL | Age: 48
Setting detail: THERAPIES SERIES
Discharge: HOME | End: 2018-07-11
Attending: HOSPITALIST

## 2018-07-11 DIAGNOSIS — M79.604 RIGHT LEG PAIN: Primary | ICD-10-CM

## 2018-07-11 PROCEDURE — 97161 PT EVAL LOW COMPLEX 20 MIN: CPT
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
## 2018-07-20 ENCOUNTER — DOCUMENTATION (OUTPATIENT)
Dept: PHYSICAL THERAPY | Facility: HOSPITAL | Age: 48
End: 2018-07-20

## 2018-07-20 ENCOUNTER — OFFICE VISIT (OUTPATIENT)
Dept: ORTHOPEDIC SURGERY | Facility: CLINIC | Age: 48
End: 2018-07-20

## 2018-07-20 DIAGNOSIS — Z09 SURGERY FOLLOW-UP: Primary | ICD-10-CM

## 2018-07-20 DIAGNOSIS — S72.001G: ICD-10-CM

## 2018-07-20 DIAGNOSIS — M79.604 RIGHT LEG PAIN: Primary | ICD-10-CM

## 2018-07-20 DIAGNOSIS — M85.88 OSTEOPENIA OF OTHER SITE: ICD-10-CM

## 2018-07-20 PROCEDURE — 99024 POSTOP FOLLOW-UP VISIT: CPT | Performed by: ORTHOPAEDIC SURGERY
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
## 2018-07-24 NOTE — THERAPY DISCHARGE NOTE
Outpatient Physical Therapy Ortho /Discharge Summary       Patient Name: Anthony Chau  : 1970  MRN: [id number]  Today's Date: 2018      Visit Date: 2018    Visit Dx:    ICD-10-CM ICD-9-CM   1. Right leg pain M79.604 729.5       Patient Active Problem List   Diagnosis   • Well woman exam with routine gynecological exam   • Closed fracture of neck of right femur (CMS/HCC)   • Tobacco abuse   • Marijuana use   • Leukocytosis   • Uterine fibroid   • Muscle weakness   • HPV (human papilloma virus) infection   • H/O blood clots        Past Medical History:   Diagnosis Date   • Anxiety    • Depression    • H/O blood clots    • History of low back pain    • HPV (human papilloma virus) infection    • Muscle weakness    • Uterine fibroid         Past Surgical History:   Procedure Laterality Date   • DIAGNOSTIC LAPAROSCOPY     • ENDOMETRIAL ABLATION     • HIP TROCANTERIC NAILING WITH INTRAMEDULLARY HIP SCREW Right 2018    Procedure: HIP TROCANTERIC NAILING WITH INTRAMEDULLARY HIP SCREW;  Surgeon: Alejandro Hernandez MD;  [facility]  Service: Orthopedics   • MULTIPLE TOOTH EXTRACTIONS     • TUBAL ABDOMINAL LIGATION                             PT OP Goals     Row Name 18 1000          PT Short Term Goals    STG Date to Achieve 18  -GB     STG 1 Patient is independent with a HEP for flexibility, ROM and initial strengthening.  -GB     STG 1 Progress Partially Met  -GB     STG 2 Pain is reduced by 25% with frequency or intensity of symptoms.  -GB     STG 2 Progress Not Met  -GB        Long Term Goals    LTG Date to Achieve 10/09/18  -GB     LTG 1 Patient is independent with a HEP for progressive strengthening and Proprioception.  -GB     LTG 1 Progress Not Met  -GB     LTG 2 Patient has returned to normal gait without need for AD and without deviations.  -GB     LTG 2 Progress Not Met  -GB     LTG 3 LEFS score is improved by at least 50 points.  -GB     LTG 3 Progress Not  Met  -NICHOLE       User Key  (r) = Recorded By, (t) = Taken By, (c) = Cosigned By    Initials Name Provider Type    NICHOLE Vaughn, PT Physical Therapist                         Time Calculation:      Therapy Suggested Charges     Code   Minutes Charges    None                     OP PT Discharge Summary  Date of Discharge: 07/20/18  Reason for Discharge: Unable to participate, other (comment) (Patient informed to stop therapy per MD)  Outcomes Achieved: Unable to tolerate or actively participate in therapy  Discharge Destination: Unknown      Tio Vaughn, PT  7/24/2018

## 2018-07-26 ENCOUNTER — HOSPITAL ENCOUNTER (OUTPATIENT)
Dept: BONE DENSITY | Facility: HOSPITAL | Age: 48
End: 2018-07-26

## 2018-07-26 ENCOUNTER — HOSPITAL ENCOUNTER (OUTPATIENT)
Dept: BONE DENSITY | Facility: HOSPITAL | Age: 48
Discharge: HOME | End: 2018-07-26
Admitting: NURSE PRACTITIONER

## 2018-07-26 DIAGNOSIS — F17.210 CIGARETTE NICOTINE DEPENDENCE WITHOUT COMPLICATION: ICD-10-CM

## 2018-07-26 DIAGNOSIS — S72.001S CLOSED FRACTURE OF NECK OF RIGHT FEMUR, SEQUELA: ICD-10-CM

## 2018-07-26 PROCEDURE — 77080 DXA BONE DENSITY AXIAL: CPT

## 2018-07-31 ENCOUNTER — TELEPHONE (OUTPATIENT)
Dept: ORTHOPEDIC SURGERY | Facility: CLINIC | Age: 48
End: 2018-07-31

## 2018-07-31 NOTE — TELEPHONE ENCOUNTER
PT CALLING TO LET DR BYNUM KNOW THAT SHE WAS DIAGNOSED WITH OSTEOPENIA. SHE JUST WANTED TO LET DR BYNUM KNOW TO SEE IF SHE NEEDED TO COME IN SOONER. HER NUMBER [phone] IF YOU HAVE ANY QUESTIONS.

## 2018-07-31 NOTE — TELEPHONE ENCOUNTER
Patient stated that she was calling to see if she should wait 10 days to have a game plan for osteopenia. I advised that since Dr. Hernandez had her scheduled for 08/10/18 then she can keep that appointment and discuss the game plan she had with her primary care doctor with him at the appointment. She understood.

## 2018-08-10 ENCOUNTER — OFFICE VISIT (OUTPATIENT)
Dept: ORTHOPEDIC SURGERY | Facility: CLINIC | Age: 48
End: 2018-08-10

## 2018-08-10 ENCOUNTER — OFFICE VISIT (OUTPATIENT)
Dept: FAMILY MEDICINE CLINIC | Facility: CLINIC | Age: 48
End: 2018-08-10

## 2018-08-10 VITALS
OXYGEN SATURATION: 97 % | SYSTOLIC BLOOD PRESSURE: 104 MMHG | HEIGHT: 62 IN | HEART RATE: 97 BPM | DIASTOLIC BLOOD PRESSURE: 78 MMHG

## 2018-08-10 DIAGNOSIS — F41.9 ANXIETY: ICD-10-CM

## 2018-08-10 DIAGNOSIS — S72.001S CLOSED FRACTURE OF NECK OF RIGHT FEMUR, SEQUELA: ICD-10-CM

## 2018-08-10 DIAGNOSIS — S72.001G: ICD-10-CM

## 2018-08-10 DIAGNOSIS — F17.200 SMOKING ADDICTION: ICD-10-CM

## 2018-08-10 DIAGNOSIS — Z86.718 HX OF BLOOD CLOTS: Primary | ICD-10-CM

## 2018-08-10 DIAGNOSIS — M85.80 OSTEOPENIA, UNSPECIFIED LOCATION: ICD-10-CM

## 2018-08-10 DIAGNOSIS — M85.80 MODERATE OSTEOPENIA: ICD-10-CM

## 2018-08-10 DIAGNOSIS — Z09 SURGERY FOLLOW-UP: Primary | ICD-10-CM

## 2018-08-10 PROCEDURE — 99024 POSTOP FOLLOW-UP VISIT: CPT | Performed by: ORTHOPAEDIC SURGERY

## 2018-08-10 PROCEDURE — 99214 OFFICE O/P EST MOD 30 MIN: CPT | Performed by: NURSE PRACTITIONER

## 2018-08-10 RX ORDER — BUPROPION HYDROCHLORIDE 150 MG/1
150 TABLET ORAL DAILY
Qty: 30 TABLET | Refills: 1 | Status: SHIPPED | OUTPATIENT
Start: 2018-08-10 | End: 2018-09-07 | Stop reason: SDUPTHER

## 2018-08-10 RX ORDER — ASPIRIN 325 MG
325 TABLET, DELAYED RELEASE (ENTERIC COATED) ORAL DAILY
Qty: 28 TABLET | Refills: 0 | Status: SHIPPED | OUTPATIENT
Start: 2018-08-10 | End: 2018-09-07 | Stop reason: SDUPTHER

## 2018-08-10 NOTE — PROGRESS NOTES
"Subjective   Anthony Chau is a 48 y.o. female.   Chief Complaint   Patient presents with   • Bone Density f/u     Patient in today to f/u on her bone density scan.    • Leg Injury     Patient in today to f/u on right hip fracture    • Anxiety     f/u        History of Present Illness   Patient is here for follow up for bone density scan results, saw the orthopedic surgeon this morning, was told her hip is still not healing well, has minimal improvement, will see him again 9/7/18, there is a possibility she will need a hip replacement, she has been told again not to bear weight. Has been taking Oscal. States she has history of \"weak bones\", had all teeth pulled by age 30, has dentures.  States she has been cutting back on cigarettes, has not started the nicotine patches, lives with her mother who smokes so it is difficult to stop.  Has complaint of anxiety, has been on medication in the past, is interested in trying something again.    The following portions of the patient's history were reviewed and updated as appropriate: allergies, current medications, past family history, past medical history, past social history, past surgical history and problem list.    Review of Systems   Constitutional: Positive for activity change.   Musculoskeletal: Positive for arthralgias and myalgias. Negative for gait problem and joint swelling.   Skin: Negative.    Neurological: Negative for weakness and numbness.   Psychiatric/Behavioral: The patient is nervous/anxious.        Objective   Physical Exam   Constitutional: She is oriented to person, place, and time. She appears well-developed and well-nourished. No distress.   HENT:   Head: Normocephalic and atraumatic.   Neck: No JVD present.   Cardiovascular: Normal rate, regular rhythm, normal heart sounds and intact distal pulses.    No murmur heard.  Pulmonary/Chest: Effort normal and breath sounds normal. No respiratory distress. She exhibits no tenderness.   Abdominal: Soft. " She exhibits no distension. There is no tenderness.   Musculoskeletal: She exhibits no edema.   In wheelchair, has walker with her.    Neurological: She is alert and oriented to person, place, and time.   Skin: Skin is warm and dry. She is not diaphoretic. No erythema. No pallor.   Psychiatric: She has a normal mood and affect.   Nursing note and vitals reviewed.      Assessment/Plan   Anthony was seen today for bone density f/u, leg injury and anxiety.    Diagnoses and all orders for this visit:    Hx of blood clots  -     aspirin  MG tablet; Take 1 tablet by mouth Daily for 28 days following completion of lovenox    Anxiety  -     buPROPion XL (WELLBUTRIN XL) 150 MG 24 hr tablet; Take 1 tablet by mouth Daily.    Smoking addiction  -     buPROPion XL (WELLBUTRIN XL) 150 MG 24 hr tablet; Take 1 tablet by mouth Daily.    Closed fracture of neck of right femur, sequela    Moderate osteopenia      Aspirin refilled due to hx of blood clots and decreased mobility  wellbutrin prescribed for anxiety, hopefully will help with smoking cessation as well. Discussed the need for smoking cessation to aid in bone healing.  I discussed treatment options for osteopenia in patient with current hip fracture. Printed sheets provided with information on various options for patient to review.  I have sent a message to alta Oseguera for his opinion on either Forteo or Tymlos since both have been shown to promote bone regrowth. If he approves of this option, I will provide samples and work on PA. I feel this may be patient's best option.   Patient was encouraged to keep me informed of any acute changes, lack of improvement, or any new concerning symptoms.Patient voiced understanding of all instructions and denied further questions.  Follow up in one month for anxiety

## 2018-08-10 NOTE — PROGRESS NOTES
Chief Complaint   Patient presents with   • Post-op Follow-up     5 weeks status post right hip trocanteric nailing with intramedullary hip screw            HPI  She is follow-up right hip femoral neck fracture with surgery on July 4.  Complicated by collapse of the fracture site.  She is a smoker with osteopenia.  She recently had a bone density test which confirmed osteopenia.  She sees her primary care physician next week.      There were no vitals filed for this visit.      Physical Exam:  Her incision is healing well.  She has no pain with hip range of motion.  She is neurovascular intact.  She does have shortening of the right lower extremity.      X-RAY REPORT:  Imaging Results (last 7 days)     Procedure Component Value Units Date/Time    XR Hip With or Without Pelvis 1 View Right [[id number]] Resulted:  08/10/18 1120     Updated:  08/10/18 1121    Narrative:       Right Hip X-Ray  Indication: Pain  AP pelvis and Frog Leg views    Findings:  Normal healing of femoral neck fracture with shortening and approach   shooting of intramedullary screws unchanged from previous x-ray  No bony lesion  Normal soft tissues  Normal joint spaces    prior studies were available for comparison.                    ICD-10-CM ICD-9-CM   1. Surgery follow-up Z09 V67.00   2. Closed displaced fracture of right femoral neck with delayed healing S72.001G V54.13   3. Osteopenia, unspecified location M85.80 733.90       Orders Placed This Encounter   Procedures   • XR Hip With or Without Pelvis 1 View Right     She will continue nonweightbearing and follow-up in one month with x-rays.  We will start physical therapy and weightbearing after that.  She may need a shoe insert or left to help with her leg length discrepancy.  We are trying to avoid revision surgery and hip replacement.  We discussed smoking cessation again.

## 2018-08-10 NOTE — PATIENT INSTRUCTIONS
Teriparatide injection  What is this medicine?  TERIPARATIDE (terr ih PAR a tyd) increases bone mass and strength. It helps make healthy bone and to slow bone loss. This medicine is used to prevent bone fractures.  This medicine may be used for other purposes; ask your health care provider or pharmacist if you have questions.  COMMON BRAND NAME(S): Leora  What should I tell my health care provider before I take this medicine?  They need to know if you have any of these conditions:  -bone disease other than osteoporosis  -high levels of calcium in the blood  -history of cancer in the bone  -kidney stone  -Paget's disease  -parathyroid disease  -receiving radiation therapy  -an unusual or allergic reaction to teriparatide, other medicines, foods, dyes, or preservatives  -pregnant or trying to get pregnant  -breast-feeding  How should I use this medicine?  This medicine is for injection under the skin. You will be taught how to prepare and give this medicine. Use exactly as directed. Take your medicine at regular intervals. Do not take your medicine more often than directed.  It is important that you put your used needles and pens in a special sharps container. Do not put them in a trash can. If you do not have a sharps container, call your pharmacist or health care provider to get one.  A special MedGuide will be given to you by the pharmacist with each prescription and refill. Be sure to read this information carefully each time.  Talk to your pediatrician regarding the use of this medicine in children. Special care may be needed.  Overdosage: If you think you have taken too much of this medicine contact a poison control center or emergency room at once.  NOTE: This medicine is only for you. Do not share this medicine with others.  What if I miss a dose?  If you miss a dose, take it as soon as you can. If it is almost time for your next dose, take only that dose. Do not take double or extra doses.  What may interact  with this medicine?  -digoxin  This list may not describe all possible interactions. Give your health care provider a list of all the medicines, herbs, non-prescription drugs, or dietary supplements you use. Also tell them if you smoke, drink alcohol, or use illegal drugs. Some items may interact with your medicine.  What should I watch for while using this medicine?  Visit your doctor or health care professional for regular checks on your progress. Your doctor may order blood tests and other tests to see how you are doing.  You should make sure you get enough calcium and vitamin D while you are taking this medicine, unless your doctor tells you not to. Discuss the foods you eat and the vitamins you take with your health care professional.  You may get drowsy or dizzy. Do not drive, use machinery, or do anything that needs mental alertness until you know how this medicine affects you. Do not stand or sit up quickly, especially if you are an older patient. This reduces the risk of dizzy or fainting spells.  Talk to your doctor about your risk of cancer. You may be more at risk for certain types of cancers if you take this medicine.  What side effects may I notice from receiving this medicine?  Side effects that you should report to your doctor or health care professional as soon as possible:  -allergic reactions like skin rash, itching or hives, swelling of the face, lips, or tongue  -blood in the urine; pain in the lower back or side; pain when urinating  -signs and symptoms of low blood pressure like dizziness; feeling faint or lightheaded, falls; unusually weak or tired  -signs and symptoms of increased calcium like nausea; vomiting; constipation; low energy; or muscle weakness  Side effects that usually do not require medical attention (report these to your doctor or health care professional if they continue or are bothersome):  -headache  -joint pain  -nausea  -pain, redness, irritation or swelling at the  injection site  -stomach upset  This list may not describe all possible side effects. Call your doctor for medical advice about side effects. You may report side effects to FDA at 3-094-FDA-9782.  Where should I keep my medicine?  Keep out of the reach of children.  Store the pens in a refrigerator between 2 and 8 degrees C (36 and 46 degrees F). Do not freeze. Use the pen quickly after taking out of the refrigerator and recap and return to refrigerator right after using. Protect from light. Throw away any unused medicine 28 days after the first injection from the pen. Throw away any unused medicine after the expiration date on the label.  NOTE: This sheet is a summary. It may not cover all possible information. If you have questions about this medicine, talk to your doctor, pharmacist, or health care provider.  © 2018 [employer] Standard (2017-05-08 10:23:57)  Abaloparatide injection  What is this medicine?  ABALOPARATIDE (a ball oh PAR a tide) increases bone mass and strength. It helps make healthy bone and to slow bone loss. This medicine is used to prevent bone fracture.  This medicine may be used for other purposes; ask your health care provider or pharmacist if you have questions.  COMMON BRAND NAME(S): TYMLOS  What should I tell my health care provider before I take this medicine?  They need to know if you have any of these conditions:  -bone disease other than osteoporosis  -high levels of calcium in the blood  -high levels of an enzyme called alkaline phosphatase in the blood  -history of cancer in the bone  -kidney stone  -Paget's disease  -parathyroid disease  -receiving radiation therapy  -an unusual or allergic reaction to abaloparatide, other medicines, foods, dyes, or preservatives  -pregnant or trying to get pregnant  -breast-feeding  How should I use this medicine?  This medicine is for injection under the skin. You will be taught how to prepare and give this medicine. Use exactly as directed.  Take your medicine at regular intervals. Do not take your medicine more often than directed.  It is important that you put your used needles and pens in a special sharps container. Do not put them in a trash can. If you do not have a sharps container, call your pharmacist or health care provider to get one.  A special MedGuide will be given to you by the pharmacist with each prescription and refill. Be sure to read this information carefully each time.  Talk to your pediatrician regarding the use of this medicine in children. Special care may be needed.  Overdosage: If you think you have taken too much of this medicine contact a poison control center or emergency room at once.  NOTE: This medicine is only for you. Do not share this medicine with others.  What if I miss a dose?  If you miss a dose, take it as soon as you can. If it is almost time for your next dose, take only that dose. Do not take double or extra doses.  What may interact with this medicine?  Interactions have not been studied.  This list may not describe all possible interactions. Give your health care provider a list of all the medicines, herbs, non-prescription drugs, or dietary supplements you use. Also tell them if you smoke, drink alcohol, or use illegal drugs. Some items may interact with your medicine.  What should I watch for while using this medicine?  Visit your doctor or health care professional for regular checks on your progress. You may need blood work done while you are taking this medicine.  You may get dizzy. Do not drive, use machinery, or do anything that needs mental alertness until you know how this medicine affects you. Do not stand or sit up quickly, especially if you are an older patient. This reduces the risk of dizzy or fainting spells. Avoid alcoholic drinks; they can make you more dizzy.  You should make sure that you get enough calcium and vitamin D while you are taking this medicine. Discuss the foods you eat and the  vitamins you take with your health care professional.  Talk to your doctor about your risk of cancer. You may be more at risk for certain types of cancers if you take this medicine.  What side effects may I notice from receiving this medicine?  Side effects that you should report to your doctor or health care professional as soon as possible:  -allergic reactions like skin rash, itching or hives, swelling of the face, lips, or tongue  -blood in the urine; pain in the lower back or side; pain when urinating  -signs and symptoms of low blood pressure like dizziness; feeling faint or lightheaded, falls; unusually weak or tired  -signs and symptoms of increased calcium like nausea; vomiting; constipation; low energy; or muscle weakness  Side effects that usually do not require medical attention (report these to your doctor or health care professional if they continue or are bothersome):  -headache  -fast, irregular heartbeat  -nausea  -pain, redness, irritation or swelling at the injection site  -stomach upset or pain  -tiredness  This list may not describe all possible side effects. Call your doctor for medical advice about side effects. You may report side effects to FDA at 0-596-FDA-4660.  Where should I keep my medicine?  Keep out of the reach of children.  Store unopened pens in the refrigerator between 2 and 8 degrees C (36 and 46 degrees F). Do not freeze. After first use, store your pen for up to 30 days at room temperature between 20 and 25 degrees C (68 and 77 degrees F). Avoid exposure to heat. Throw away any unused medicine after the expiration date on the label.  NOTE: This sheet is a summary. It may not cover all possible information. If you have questions about this medicine, talk to your doctor, pharmacist, or health care provider.  © 2018 [employer] Standard (2017-05-08 10:03:05)  Denosumab injection  What is this medicine?  DENOSUMAB (den oh romi mab) slows bone breakdown. Prolia is used to treat  osteoporosis in women after menopause and in men. Xgeva is used to treat a high calcium level due to cancer and to prevent bone fractures and other bone problems caused by multiple myeloma or cancer bone metastases. Xgeva is also used to treat giant cell tumor of the bone.  This medicine may be used for other purposes; ask your health care provider or pharmacist if you have questions.  COMMON BRAND NAME(S): Prolia, XGEVA  What should I tell my health care provider before I take this medicine?  They need to know if you have any of these conditions:  -dental disease  -having surgery or tooth extraction  -infection  -kidney disease  -low levels of calcium or Vitamin D in the blood  -malnutrition  -on hemodialysis  -skin conditions or sensitivity  -thyroid or parathyroid disease  -an unusual reaction to denosumab, other medicines, foods, dyes, or preservatives  -pregnant or trying to get pregnant  -breast-feeding  How should I use this medicine?  This medicine is for injection under the skin. It is given by a health care professional in a hospital or clinic setting.  If you are getting Prolia, a special MedGuide will be given to you by the pharmacist with each prescription and refill. Be sure to read this information carefully each time.  For Prolia, talk to your pediatrician regarding the use of this medicine in children. Special care may be needed. For Xgeva, talk to your pediatrician regarding the use of this medicine in children. While this drug may be prescribed for children as young as 13 years for selected conditions, precautions do apply.  Overdosage: If you think you have taken too much of this medicine contact a poison control center or emergency room at once.  NOTE: This medicine is only for you. Do not share this medicine with others.  What if I miss a dose?  It is important not to miss your dose. Call your doctor or health care professional if you are unable to keep an appointment.  What may interact with  this medicine?  Do not take this medicine with any of the following medications:  -other medicines containing denosumab  This medicine may also interact with the following medications:  -medicines that lower your chance of fighting infection  -steroid medicines like prednisone or cortisone  This list may not describe all possible interactions. Give your health care provider a list of all the medicines, herbs, non-prescription drugs, or dietary supplements you use. Also tell them if you smoke, drink alcohol, or use illegal drugs. Some items may interact with your medicine.  What should I watch for while using this medicine?  Visit your doctor or health care professional for regular checks on your progress. Your doctor or health care professional may order blood tests and other tests to see how you are doing.  Call your doctor or health care professional for advice if you get a fever, chills or sore throat, or other symptoms of a cold or flu. Do not treat yourself. This drug may decrease your body's ability to fight infection. Try to avoid being around people who are sick.  You should make sure you get enough calcium and vitamin D while you are taking this medicine, unless your doctor tells you not to. Discuss the foods you eat and the vitamins you take with your health care professional.  See your dentist regularly. Brush and floss your teeth as directed. Before you have any dental work done, tell your dentist you are receiving this medicine.  Do not become pregnant while taking this medicine or for 5 months after stopping it. Talk with your doctor or health care professional about your birth control options while taking this medicine. Women should inform their doctor if they wish to become pregnant or think they might be pregnant. There is a potential for serious side effects to an unborn child. Talk to your health care professional or pharmacist for more information.  What side effects may I notice from receiving  this medicine?  Side effects that you should report to your doctor or health care professional as soon as possible:  -allergic reactions like skin rash, itching or hives, swelling of the face, lips, or tongue  -bone pain  -breathing problems  -dizziness  -jaw pain, especially after dental work  -redness, blistering, peeling of the skin  -signs and symptoms of infection like fever or chills; cough; sore throat; pain or trouble passing urine  -signs of low calcium like fast heartbeat, muscle cramps or muscle pain; pain, tingling, numbness in the hands or feet; seizures  -unusual bleeding or bruising  -unusually weak or tired  Side effects that usually do not require medical attention (report to your doctor or health care professional if they continue or are bothersome):  -constipation  -diarrhea  -headache  -joint pain  -loss of appetite  -muscle pain  -runny nose  -tiredness  -upset stomach  This list may not describe all possible side effects. Call your doctor for medical advice about side effects. You may report side effects to FDA at 9-511-FDA-7087.  Where should I keep my medicine?  This medicine is only given in a clinic, doctor's office, or other health care setting and will not be stored at home.  NOTE: This sheet is a summary. It may not cover all possible information. If you have questions about this medicine, talk to your doctor, pharmacist, or health care provider.  © 2018 [employer] Standard (2018-01-09 19:17:21)  Alendronate tablets  What is this medicine?  ALENDRONATE (a MARKELL droe shasta) slows calcium loss from bones. It helps to make normal healthy bone and to slow bone loss in people with Paget's disease and osteoporosis. It may be used in others at risk for bone loss.  This medicine may be used for other purposes; ask your health care provider or pharmacist if you have questions.  COMMON BRAND NAME(S): Fosamax  What should I tell my health care provider before I take this medicine?  They need to  know if you have any of these conditions:  -dental disease  -esophagus, stomach, or intestine problems, like acid reflux or GERD  -kidney disease  -low blood calcium  -low vitamin D  -problems sitting or standing 30 minutes  -trouble swallowing  -an unusual or allergic reaction to alendronate, other medicines, foods, dyes, or preservatives  -pregnant or trying to get pregnant  -breast-feeding  How should I use this medicine?  You must take this medicine exactly as directed or you will lower the amount of the medicine you absorb into your body or you may cause yourself harm. Take this medicine by mouth first thing in the morning, after you are up for the day. Do not eat or drink anything before you take your medicine. Swallow the tablet with a full glass (6 to 8 fluid ounces) of plain water. Do not take this medicine with any other drink. Do not chew or crush the tablet. After taking this medicine, do not eat breakfast, drink, or take any medicines or vitamins for at least 30 minutes. Sit or stand up for at least 30 minutes after you take this medicine; do not lie down. Do not take your medicine more often than directed.  Talk to your pediatrician regarding the use of this medicine in children. Special care may be needed.  Overdosage: If you think you have taken too much of this medicine contact a poison control center or emergency room at once.  NOTE: This medicine is only for you. Do not share this medicine with others.  What if I miss a dose?  If you miss a dose, do not take it later in the day. Continue your normal schedule starting the next morning. Do not take double or extra doses.  What may interact with this medicine?  -aluminum hydroxide  -antacids  -aspirin  -calcium supplements  -drugs for inflammation like ibuprofen, naproxen, and others  -iron supplements  -magnesium supplements  -vitamins with minerals  This list may not describe all possible interactions. Give your health care provider a list of all  the medicines, herbs, non-prescription drugs, or dietary supplements you use. Also tell them if you smoke, drink alcohol, or use illegal drugs. Some items may interact with your medicine.  What should I watch for while using this medicine?  Visit your doctor or health care professional for regular checks ups. It may be some time before you see benefit from this medicine. Do not stop taking your medicine except on your doctor's advice. Your doctor or health care professional may order blood tests and other tests to see how you are doing.  You should make sure you get enough calcium and vitamin D while you are taking this medicine, unless your doctor tells you not to. Discuss the foods you eat and the vitamins you take with your health care professional.  Some people who take this medicine have severe bone, joint, and/or muscle pain. This medicine may also increase your risk for a broken thigh bone. Tell your doctor right away if you have pain in your upper leg or groin. Tell your doctor if you have any pain that does not go away or that gets worse.  This medicine can make you more sensitive to the sun. If you get a rash while taking this medicine, sunlight may cause the rash to get worse. Keep out of the sun. If you cannot avoid being in the sun, wear protective clothing and use sunscreen. Do not use sun lamps or tanning beds/booths.  What side effects may I notice from receiving this medicine?  Side effects that you should report to your doctor or health care professional as soon as possible:  -allergic reactions like skin rash, itching or hives, swelling of the face, lips, or tongue  -black or tarry stools  -bone, muscle or joint pain  -changes in vision  -chest pain  -heartburn or stomach pain  -jaw pain, especially after dental work  -pain or trouble when swallowing  -redness, blistering, peeling or loosening of the skin, including inside the mouth  Side effects that usually do not require medical attention  (report to your doctor or health care professional if they continue or are bothersome):  -changes in taste  -diarrhea or constipation  -eye pain or itching  -headache  -nausea or vomiting  -stomach gas or fullness  This list may not describe all possible side effects. Call your doctor for medical advice about side effects. You may report side effects to FDA at 1-380-FDA-2735.  Where should I keep my medicine?  Keep out of the reach of children.  Store at room temperature of 15 and 30 degrees C (59 and 86 degrees F). Throw away any unused medicine after the expiration date.  NOTE: This sheet is a summary. It may not cover all possible information. If you have questions about this medicine, talk to your doctor, pharmacist, or health care provider.  © 2018 [employer] Standard (2012-06-15 08:56:09)  Zoledronic Acid injection (Paget's Disease, Osteoporosis)  What is this medicine?  ZOLEDRONIC ACID (ALEKSEY le giuseppen ik AS id) lowers the amount of calcium loss from bone. It is used to treat Paget's disease and osteoporosis in women.  This medicine may be used for other purposes; ask your health care provider or pharmacist if you have questions.  COMMON BRAND NAME(S): Reclast, Zometa  What should I tell my health care provider before I take this medicine?  They need to know if you have any of these conditions:  -aspirin-sensitive asthma  -cancer, especially if you are receiving medicines used to treat cancer  -dental disease or wear dentures  -infection  -kidney disease  -low levels of calcium in the blood  -past surgery on the parathyroid gland or intestines  -receiving corticosteroids like dexamethasone or prednisone  -an unusual or allergic reaction to zoledronic acid, other medicines, foods, dyes, or preservatives  -pregnant or trying to get pregnant  -breast-feeding  How should I use this medicine?  This medicine is for infusion into a vein. It is given by a health care professional in a hospital or clinic setting.  Talk  to your pediatrician regarding the use of this medicine in children. This medicine is not approved for use in children.  Overdosage: If you think you have taken too much of this medicine contact a poison control center or emergency room at once.  NOTE: This medicine is only for you. Do not share this medicine with others.  What if I miss a dose?  It is important not to miss your dose. Call your doctor or health care professional if you are unable to keep an appointment.  What may interact with this medicine?  -certain antibiotics given by injection  -NSAIDs, medicines for pain and inflammation, like ibuprofen or naproxen  -some diuretics like bumetanide, furosemide  -teriparatide  This list may not describe all possible interactions. Give your health care provider a list of all the medicines, herbs, non-prescription drugs, or dietary supplements you use. Also tell them if you smoke, drink alcohol, or use illegal drugs. Some items may interact with your medicine.  What should I watch for while using this medicine?  Visit your doctor or health care professional for regular checkups. It may be some time before you see the benefit from this medicine. Do not stop taking your medicine unless your doctor tells you to. Your doctor may order blood tests or other tests to see how you are doing.  Women should inform their doctor if they wish to become pregnant or think they might be pregnant. There is a potential for serious side effects to an unborn child. Talk to your health care professional or pharmacist for more information.  You should make sure that you get enough calcium and vitamin D while you are taking this medicine. Discuss the foods you eat and the vitamins you take with your health care professional.  Some people who take this medicine have severe bone, joint, and/or muscle pain. This medicine may also increase your risk for jaw problems or a broken thigh bone. Tell your doctor right away if you have severe pain  in your jaw, bones, joints, or muscles. Tell your doctor if you have any pain that does not go away or that gets worse.  Tell your dentist and dental surgeon that you are taking this medicine. You should not have major dental surgery while on this medicine. See your dentist to have a dental exam and fix any dental problems before starting this medicine. Take good care of your teeth while on this medicine. Make sure you see your dentist for regular follow-up appointments.  What side effects may I notice from receiving this medicine?  Side effects that you should report to your doctor or health care professional as soon as possible:  -allergic reactions like skin rash, itching or hives, swelling of the face, lips, or tongue  -anxiety, confusion, or depression  -breathing problems  -changes in vision  -eye pain  -feeling faint or lightheaded, falls  -jaw pain, especially after dental work  -mouth sores  -muscle cramps, stiffness, or weakness  -redness, blistering, peeling or loosening of the skin, including inside the mouth  -trouble passing urine or change in the amount of urine  Side effects that usually do not require medical attention (report to your doctor or health care professional if they continue or are bothersome):  -bone, joint, or muscle pain  -constipation  -diarrhea  -fever  -hair loss  -irritation at site where injected  -loss of appetite  -nausea, vomiting  -stomach upset  -trouble sleeping  -trouble swallowing  -weak or tired  This list may not describe all possible side effects. Call your doctor for medical advice about side effects. You may report side effects to FDA at 6-169-FDA-7178.  Where should I keep my medicine?  This drug is given in a hospital or clinic and will not be stored at home.  NOTE: This sheet is a summary. It may not cover all possible information. If you have questions about this medicine, talk to your doctor, pharmacist, or health care provider.  © 2018 Elsevier/Gold Standard  (2015-05-16 14:19:57)    Osteoporosis  Osteoporosis happens when your bones become thinner and weaker. Weak bones can break (fracture) more easily when you slip or fall. Bones most at risk of breaking are in the hip, wrist, and spine.  Follow these instructions at home:  · Get enough calcium and vitamin D. These nutrients are good for your bones.  · Exercise as told by your doctor.  · Do not use any tobacco products. This includes cigarettes, chewing tobacco, and electronic cigarettes. If you need help quitting, ask your doctor.  · Limit the amount of alcohol you drink.  · Take medicines only as told by your doctor.  · Keep all follow-up visits as told by your doctor. This is important.  · Take care at home to prevent falls. Some ways to do this are:  ? Keep rooms well lit and tidy.  ? Put safety rails on your stairs.  ? Put a rubber mat in the bathroom and other places that are often wet or slippery.  Get help right away if:  · You fall.  · You hurt yourself.  This information is not intended to replace advice given to you by your health care provider. Make sure you discuss any questions you have with your health care provider.  Document Released: 03/11/2013 Document Revised: 05/25/2017 Document Reviewed: 05/28/2015  Elsevier Interactive Patient Education © 2018 Elsevier Inc.

## 2018-08-13 ENCOUNTER — TELEPHONE (OUTPATIENT)
Dept: FAMILY MEDICINE CLINIC | Facility: CLINIC | Age: 48
End: 2018-08-13

## 2018-08-13 DIAGNOSIS — M80.00XG OSTEOPOROSIS WITH CURRENT PATHOLOGICAL FRACTURE WITH DELAYED HEALING, UNSPECIFIED OSTEOPOROSIS TYPE, SUBSEQUENT ENCOUNTER: Primary | ICD-10-CM

## 2018-08-13 NOTE — TELEPHONE ENCOUNTER
----- Message from Alejandro Hernandez MD sent at 8/13/2018  9:30 AM EDT -----  Regarding: RE: osteopenia with fracture, treatment options  You may start treatment with either injection for osteoporosis. Thank you  ----- Message -----  From: Hollie Chapa APRN  Sent: 8/10/2018   2:22 PM  To: Alejandro Hernandez MD  Subject: osteopenia with fracture, treatment options      Dr. Hernandez,  I wanted to get your opinion on osteopenia treatment options for Ms. Chau using either Forteo or Tymlos injections. Both have these have been shown to produce bone regrowth in as little as 3 months, with optimal regrowth at 18-21 months.   I discussed side effects and provided patient with printouts of the various options including oral vs injections. Patient is willing to try injections, but I didn't know if this would cause any complications with her current fracture management and wanted your OK before trying to get a pre-auth from insurance.   I also have discussed smoking cessation for better bone healing, and prescribed Wellbutrin today.  Thank you,  Hollie Chapa

## 2018-08-13 NOTE — TELEPHONE ENCOUNTER
Called and informed pt of recommendations. Pt verbalized understanding and stated she would be by this week sometime to  samples. She had no further questions.

## 2018-08-13 NOTE — TELEPHONE ENCOUNTER
Please call patient and tell her Dr. Hernandez agrees with starting osteoporosis med. I have samples of Forteo if she can come in this week to get started

## 2018-08-16 DIAGNOSIS — M80.00XG OSTEOPOROSIS WITH CURRENT PATHOLOGICAL FRACTURE WITH DELAYED HEALING, UNSPECIFIED OSTEOPOROSIS TYPE, SUBSEQUENT ENCOUNTER: Primary | ICD-10-CM

## 2018-08-16 NOTE — TELEPHONE ENCOUNTER
Patient here today, was given 2 samples (28 doses each) of Forteo, I showed her how to use pens, she demonstrated proper use by giving the first injection in office today. Package inserts and information provided today, patient will notify if she has side effects or bone pain.

## 2018-09-07 ENCOUNTER — OFFICE VISIT (OUTPATIENT)
Dept: FAMILY MEDICINE CLINIC | Facility: CLINIC | Age: 48
End: 2018-09-07

## 2018-09-07 ENCOUNTER — OFFICE VISIT (OUTPATIENT)
Dept: ORTHOPEDIC SURGERY | Facility: CLINIC | Age: 48
End: 2018-09-07

## 2018-09-07 VITALS
DIASTOLIC BLOOD PRESSURE: 80 MMHG | BODY MASS INDEX: 21.71 KG/M2 | WEIGHT: 118 LBS | HEIGHT: 62 IN | SYSTOLIC BLOOD PRESSURE: 114 MMHG

## 2018-09-07 DIAGNOSIS — Z78.9 DEEP VEIN THROMBOSIS (DVT) PROPHYLAXIS PRESCRIBED AT DISCHARGE: Primary | ICD-10-CM

## 2018-09-07 DIAGNOSIS — M85.80 OSTEOPENIA, UNSPECIFIED LOCATION: ICD-10-CM

## 2018-09-07 DIAGNOSIS — F17.200 SMOKING ADDICTION: ICD-10-CM

## 2018-09-07 DIAGNOSIS — Z09 SURGERY FOLLOW-UP: Primary | ICD-10-CM

## 2018-09-07 DIAGNOSIS — S72.001G: ICD-10-CM

## 2018-09-07 DIAGNOSIS — F41.9 ANXIETY: ICD-10-CM

## 2018-09-07 DIAGNOSIS — M80.00XG OSTEOPOROSIS WITH CURRENT PATHOLOGICAL FRACTURE WITH DELAYED HEALING, UNSPECIFIED OSTEOPOROSIS TYPE, SUBSEQUENT ENCOUNTER: ICD-10-CM

## 2018-09-07 PROCEDURE — 99214 OFFICE O/P EST MOD 30 MIN: CPT | Performed by: NURSE PRACTITIONER

## 2018-09-07 PROCEDURE — 99024 POSTOP FOLLOW-UP VISIT: CPT | Performed by: ORTHOPAEDIC SURGERY

## 2018-09-07 RX ORDER — ASPIRIN 325 MG
325 TABLET, DELAYED RELEASE (ENTERIC COATED) ORAL DAILY
Qty: 30 TABLET | Refills: 2 | Status: SHIPPED | OUTPATIENT
Start: 2018-09-07 | End: 2018-12-16 | Stop reason: SDUPTHER

## 2018-09-07 RX ORDER — BUPROPION HYDROCHLORIDE 150 MG/1
150 TABLET ORAL DAILY
Qty: 30 TABLET | Refills: 3 | Status: SHIPPED | OUTPATIENT
Start: 2018-09-07 | End: 2019-01-22 | Stop reason: SDUPTHER

## 2018-09-07 NOTE — PROGRESS NOTES
Chief Complaint   Patient presents with   • Post-op     1 month f/u; 9 weeks status post Right Hip Trocanteric Nailing with Intramedullary Hip Screw 7/4/18           HPI  She is 2 months out from right hip screw fixation.  She has osteopenia with a T score of -2.3 and is on Forteo.  She feels slightly better.  She has been nonweightbearing.      There were no vitals filed for this visit.      Physical Exam:  The right hip is less painful with range of motion.  She is nonweightbearing right lower extremity.      X-RAY REPORT:  Imaging Results (last 7 days)     Procedure Component Value Units Date/Time    XR Hip With or Without Pelvis 1 View Right [[id number]] Resulted:  09/07/18 1056     Updated:  09/07/18 1057    Narrative:       Right Hip X-Ray  Indication: Pain  AP pelvis and Frog Leg views    Findings:  No change in his screws or fracture right hip.  There may be subtle   increase in healing but not completely healed  No bony lesion  Normal soft tissues  Normal joint spaces    prior studies were available for comparison.                    ICD-10-CM ICD-9-CM   1. Surgery follow-up Z09 V67.00   2. Closed displaced fracture of right femoral neck with delayed healing S72.001G V54.13   3. Osteopenia, unspecified location M85.80 733.90     She'll continue nonweightbearing and follow-up in one month.  She will continue the Forteo to treat her osteopenia borderline osteoporosis.  I will see her back in 1 month.

## 2018-09-07 NOTE — PROGRESS NOTES
Subjective   Anthony Chau is a 48 y.o. female.   Chief Complaint   Patient presents with   • Follow-up   • Anxiety       History of Present Illness   Patient is taking the Wellbutrin, is starting to help with anxiety, is still smoking 0.5 packs per day. Plans to start nicotine patches today. Has been taking the Forteo every day. Still do not have PA, so patient is getting samples. She saw the orthopedic surgeon today, her left hip fracture shows minimal healing, she is still non-weight bearing, but doing foot and knee movements.   The following portions of the patient's history were reviewed and updated as appropriate: allergies, current medications, past family history, past medical history, past social history, past surgical history and problem list.    Review of Systems   Constitutional: Positive for activity change. Negative for chills and fever.   HENT: Negative for congestion.    Respiratory: Positive for cough and shortness of breath (with anxiety). Negative for wheezing.    Cardiovascular: Positive for palpitations (with anxiety). Negative for chest pain.   Gastrointestinal: Positive for constipation (stool softeners are helping.). Negative for diarrhea.   Musculoskeletal: Positive for arthralgias and myalgias. Negative for gait problem and joint swelling.   Skin: Negative.    Neurological: Negative for weakness and numbness.   Psychiatric/Behavioral: The patient is nervous/anxious.        Objective   Physical Exam   Constitutional: She is oriented to person, place, and time. She appears well-developed and well-nourished.   HENT:   Head: Normocephalic and atraumatic.   Neck: No thyroid mass and no thyromegaly present.   Cardiovascular: Normal rate, regular rhythm and normal heart sounds.    No murmur heard.  Pulmonary/Chest: Effort normal and breath sounds normal. No respiratory distress. She has no wheezes. She has no rales. She exhibits no tenderness.   Neurological: She is alert and oriented to  person, place, and time.   Psychiatric: Her speech is normal and behavior is normal. Judgment and thought content normal. Her mood appears anxious. Her affect is not angry and not inappropriate. Cognition and memory are normal. She does not exhibit a depressed mood. She is attentive.   Nursing note and vitals reviewed.      Assessment/Plan   Anthony was seen today for follow-up and anxiety.    Diagnoses and all orders for this visit:    Deep vein thrombosis (DVT) prophylaxis prescribed at discharge  -     aspirin  MG tablet; Take 1 tablet by mouth Daily for 28 days following completion of lovenox    Osteoporosis with current pathological fracture with delayed healing, unspecified osteoporosis type, subsequent encounter  -     Insulin Pen Needle 32G X 4 MM misc; Use 1 needle Daily.    Anxiety  -     buPROPion XL (WELLBUTRIN XL) 150 MG 24 hr tablet; Take 1 tablet by mouth Daily.    Smoking addiction  -     buPROPion XL (WELLBUTRIN XL) 150 MG 24 hr tablet; Take 1 tablet by mouth Daily.        Aspirin refilled for DVT prophylaxis until patient is able to be more active and fracture has healed  2  Forteo samples given today, I want to keep patient on this for as long as possible using samples if [employer] denies coverage.   Anxiety is stable, wellbutrin refilled.  Encouraged smoking cessation, discussed the importance of this for bone healing.  Patient was encouraged to keep me informed of any acute changes, lack of improvement, or any new concerning symptoms.Patient voiced understanding of all instructions and denied further questions.

## 2018-10-05 ENCOUNTER — OFFICE VISIT (OUTPATIENT)
Dept: ORTHOPEDIC SURGERY | Facility: CLINIC | Age: 48
End: 2018-10-05

## 2018-10-05 DIAGNOSIS — M21.70 LEG LENGTH DISCREPANCY: ICD-10-CM

## 2018-10-05 DIAGNOSIS — M85.80 OSTEOPENIA, UNSPECIFIED LOCATION: ICD-10-CM

## 2018-10-05 DIAGNOSIS — S72.001G CLOSED FRACTURE OF NECK OF RIGHT FEMUR WITH DELAYED HEALING, SUBSEQUENT ENCOUNTER: ICD-10-CM

## 2018-10-05 DIAGNOSIS — Z09 SURGERY FOLLOW-UP: Primary | ICD-10-CM

## 2018-10-05 DIAGNOSIS — F17.210 CIGARETTE SMOKER: ICD-10-CM

## 2018-10-05 PROCEDURE — 99212 OFFICE O/P EST SF 10 MIN: CPT | Performed by: ORTHOPAEDIC SURGERY

## 2018-10-05 NOTE — PROGRESS NOTES
Chief Complaint   Patient presents with   • Post-op Follow-up     4 week f/u: 13 weeks s/p Right Hip Trocanteric Nailing with Intramedullary Hip Screw 7/4/18           HPI  She is follow-up right hip retained spitting July 4.  She's been nonweightbearing.  Pain is 2 out of 10 at times.      There were no vitals filed for this visit.      Physical Exam:  She has about a 1-1/2 leg length shortening on the right.  No significant pain with hip range of motion.  She has minimal trochanteric tenderness.      X-RAY REPORT:  Imaging Results (last 7 days)     Procedure Component Value Units Date/Time    XR Hip With or Without Pelvis 1 View Right [[id number]] Resulted:  10/05/18 1048     Updated:  10/05/18 1048    Narrative:       Right Hip X-Ray  Indication: Pain  AP pelvis and Frog Leg views    Findings:  No significant change in right hip femoral neck fracture she still has a   shortening with prominence of the screw with no significant change  No bony lesion  Normal soft tissues  Normal joint spaces    prior studies were available for comparison.                    ICD-10-CM ICD-9-CM   1. Surgery follow-up Z09 V67.00   2. Osteopenia, unspecified location M85.80 733.90   3. Closed fracture of neck of right femur with delayed healing, subsequent encounter S72.001G V54.13   4. Cigarette smoker F17.210 305.1   5. Leg length discrepancy M21.70 736.81       Orders Placed This Encounter   Procedures   • XR Hip With or Without Pelvis 1 View Right     I will order a CT scan of the right hip to evaluate for nonunion.  I will see her back after the CT scan.  She may partially weight-bear as tolerated.

## 2018-10-11 ENCOUNTER — HOSPITAL ENCOUNTER (OUTPATIENT)
Dept: CT IMAGING | Facility: HOSPITAL | Age: 48
Discharge: HOME | End: 2018-10-11
Attending: ORTHOPAEDIC SURGERY | Admitting: ORTHOPAEDIC SURGERY

## 2018-10-11 DIAGNOSIS — S72.001G CLOSED FRACTURE OF NECK OF RIGHT FEMUR WITH DELAYED HEALING, SUBSEQUENT ENCOUNTER: ICD-10-CM

## 2018-10-11 PROCEDURE — 73700 CT LOWER EXTREMITY W/O DYE: CPT

## 2018-10-26 ENCOUNTER — OFFICE VISIT (OUTPATIENT)
Dept: ORTHOPEDIC SURGERY | Facility: CLINIC | Age: 48
End: 2018-10-26

## 2018-10-26 VITALS — WEIGHT: 117.95 LBS | BODY MASS INDEX: 21.7 KG/M2 | HEIGHT: 62 IN

## 2018-10-26 DIAGNOSIS — F17.210 CIGARETTE SMOKER: ICD-10-CM

## 2018-10-26 DIAGNOSIS — M21.70 LEG LENGTH DISCREPANCY: ICD-10-CM

## 2018-10-26 DIAGNOSIS — M85.80 OSTEOPENIA, UNSPECIFIED LOCATION: ICD-10-CM

## 2018-10-26 DIAGNOSIS — S72.001G CLOSED FRACTURE OF NECK OF RIGHT FEMUR WITH DELAYED HEALING, SUBSEQUENT ENCOUNTER: Primary | ICD-10-CM

## 2018-10-26 PROCEDURE — 99212 OFFICE O/P EST SF 10 MIN: CPT | Performed by: ORTHOPAEDIC SURGERY

## 2018-10-26 NOTE — PROGRESS NOTES
Chief Complaint   Patient presents with   • Right Hip - Follow-up     Follow up after CT scan ( 10/11/18)           HPI  She is follow-up right hip femoral neck fracture fixation on July 4.  She's had a delayed union with osteopenia borderline osteoporosis.  She is a cigarette smoker unable to quit.  She is here with her .  She has no pain unless she tries to walk on her hip.      There were no vitals filed for this visit.      Physical Exam:  Her right hip has shortening with pain on internal/external rotation.  Distally neurovascular intact.  Her incision healed.      X-RAY REPORT:  Imaging Results (last 7 days)     ** No results found for the last 168 hours. **        I viewed her CT scan from October 10 which shows no change from previous x-rays and CT scan consistent with nonunion.        ICD-10-CM ICD-9-CM   1. Closed fracture of neck of right femur with delayed healing, subsequent encounter S72.001G V54.13   2. Cigarette smoker F17.210 305.1   3. Leg length discrepancy M21.70 736.81   4. 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  This is complicated by her cigarette smoking and borderline osteoporosis.  She is on Forteo.  We discussed stopping the cigarette smoking.  I will refer her to Dr. Peña for possible surgical treatment with a hip replacement.

## 2018-10-30 ENCOUNTER — TELEPHONE (OUTPATIENT)
Dept: FAMILY MEDICINE CLINIC | Facility: CLINIC | Age: 48
End: 2018-10-30

## 2018-10-30 ENCOUNTER — OFFICE VISIT (OUTPATIENT)
Dept: ORTHOPEDIC SURGERY | Facility: CLINIC | Age: 48
End: 2018-10-30

## 2018-10-30 VITALS — HEIGHT: 62 IN | WEIGHT: 117.95 LBS | OXYGEN SATURATION: 98 % | BODY MASS INDEX: 21.7 KG/M2 | HEART RATE: 87 BPM

## 2018-10-30 DIAGNOSIS — S72.001G: Primary | ICD-10-CM

## 2018-10-30 DIAGNOSIS — Z72.0 TOBACCO ABUSE: ICD-10-CM

## 2018-10-30 DIAGNOSIS — M21.70 LEG LENGTH DISCREPANCY: ICD-10-CM

## 2018-10-30 DIAGNOSIS — S72.001G CLOSED FRACTURE OF NECK OF RIGHT FEMUR WITH DELAYED HEALING, SUBSEQUENT ENCOUNTER: Primary | ICD-10-CM

## 2018-10-30 DIAGNOSIS — M85.80 OSTEOPENIA, UNSPECIFIED LOCATION: ICD-10-CM

## 2018-10-30 PROCEDURE — 99406 BEHAV CHNG SMOKING 3-10 MIN: CPT | Performed by: ORTHOPAEDIC SURGERY

## 2018-10-30 PROCEDURE — 99214 OFFICE O/P EST MOD 30 MIN: CPT | Performed by: ORTHOPAEDIC SURGERY

## 2018-10-30 RX ORDER — ASPIRIN 325 MG
TABLET, DELAYED RELEASE (ENTERIC COATED) ORAL
Refills: 2 | COMMUNITY
Start: 2018-10-21 | End: 2019-09-26

## 2018-10-30 NOTE — PROGRESS NOTES
Orthopaedic Clinic Note: Hip New Patient    Chief Complaint   Patient presents with   • Right Hip - Pain        HPI    Anthony Chau is a 48 y.o. female who presents with right hip pain for 3 month(s). Onset began after she sustained a mechanical fall resulting in a displaced femoral neck fracture.  She underwent closed reduction percutaneous pinning by Dr. Hernandez.  Shortly after the surgical intervention, the patient developed collapse of the fracture and subsequent failure to heal.  She continues to have ongoing pain localized to the groin region.  She rates her pain a 7/10 on the pain scale.  At her last visit with Dr. Hernandez she was referred to me for further evaluation and discussion of possible hip replacement.  This is the first time seen this patient.  Upon talking to the patient today, she is complaining of pain localized to groin and lateral trochanter region.  Her pain is worse with weightbearing and walking.  Sitting and resting improves her pain.  She continues to use a walker for mobilization.  She is also a daily smoker.  She is here today to discuss treatment options as her ongoing hip pain is limiting her ability to perform daily activities and significantly decreasing her quality of life.      Past Medical History:   Diagnosis Date   • Anxiety    • Depression    • H/O blood clots    • History of low back pain    • HPV (human papilloma virus) infection    • Muscle weakness    • Uterine fibroid       Past Surgical History:   Procedure Laterality Date   • DIAGNOSTIC LAPAROSCOPY     • ENDOMETRIAL ABLATION  2006   • HIP TROCANTERIC NAILING WITH INTRAMEDULLARY HIP SCREW Right 7/4/2018    Procedure: HIP TROCANTERIC NAILING WITH INTRAMEDULLARY HIP SCREW;  Surgeon: Alejandro Hernandez MD;  Location: [facility];  Service: Orthopedics   • MULTIPLE TOOTH EXTRACTIONS     • TUBAL ABDOMINAL LIGATION        Family History   Problem Relation Age of Onset   • Osteoporosis Other    • Arthritis Mother    • Asthma  Mother    • Cancer Mother    • COPD Mother    • Hyperlipidemia Mother    • Hypertension Mother    • Kidney disease Mother    • Miscarriages / Stillbirths Mother    • Osteoporosis Mother    • Cancer Father    • Hearing loss Father    • Arthritis Maternal Aunt    • Osteoporosis Maternal Aunt    • Cancer Paternal Aunt    • Osteoporosis Paternal Aunt    • Alcohol abuse Maternal Grandmother    • Arthritis Maternal Grandmother    • Diabetes Maternal Grandmother    • Hyperlipidemia Maternal Grandmother    • Hypertension Maternal Grandmother    • Osteoporosis Maternal Grandmother    • COPD Paternal Grandmother      Social History     Social History   • Marital status: Single     Spouse name: N/A   • Number of children: N/A   • Years of education: N/A     Occupational History   • Not on file.     Social History Main Topics   • Smoking status: Current Every Day Smoker     Packs/day: 0.50     Years: 30.00     Types: Cigarettes   • Smokeless tobacco: Never Used   • Alcohol use Yes      Comment: Occasional   • Drug use: Yes     Types: Marijuana      Comment: occ   • Sexual activity: Defer     Other Topics Concern   • Not on file     Social History Narrative   • No narrative on file      Current Outpatient Prescriptions on File Prior to Visit   Medication Sig Dispense Refill   • buPROPion XL (WELLBUTRIN XL) 150 MG 24 hr tablet Take 1 tablet by mouth Daily. 30 tablet 3   • Calcium Carbonate-Vitamin D (OSCAL-500) 500-400 MG-UNIT per tablet Take 1 tablet by mouth Daily.     • Docusate Calcium (STOOL SOFTENER PO) Take 1 capsule by mouth Daily.     • Insulin Pen Needle 32G X 4 MM misc Use 1 needle Daily. 100 each 3   • nicotine (NICODERM CQ) 21 MG/24HR patch Place 1 patch on the skin Daily. 30 patch 1   • teriparatide (FORTEO) 600 MCG/2.4ML injection Inject 0.08 mL under the skin into the appropriate area as directed Daily. 2.4 mL 11     No current facility-administered medications on file prior to visit.       No Known Allergies  "    Review of Systems   Constitutional: Negative.    HENT: Positive for ear pain, facial swelling, mouth sores and sneezing.    Eyes: Negative.    Respiratory: Negative.    Cardiovascular: Positive for palpitations and leg swelling.   Gastrointestinal: Negative.    Endocrine: Negative.    Genitourinary: Positive for frequency, pelvic pain and urgency.   Musculoskeletal: Positive for arthralgias and back pain.   Skin: Negative.    Allergic/Immunologic: Negative.    Neurological: Negative.    Hematological: Bruises/bleeds easily.   Psychiatric/Behavioral: Positive for sleep disturbance. The patient is nervous/anxious.         The following portions of the patient's history were reviewed and updated as appropriate: allergies, current medications, past family history, past medical history, past social history, past surgical history and problem list.    Physical Exam  Pulse 87, height 157.5 cm (62.01\"), weight 53.5 kg (117 lb 15.1 oz), SpO2 98 %.    Body mass index is 21.57 kg/m².    GENERAL APPEARANCE: awake, alert & oriented x 3, in no acute distress and well developed, well nourished  PSYCH: normal affect  LUNGS:  breathing nonlabored  EYES: sclera anicteric  CARDIOVASCULAR: palpable dorsalis pedis, palpable posterior tibial bilaterally. Capillary refill less than 2 seconds  EXTREMITIES: no clubbing, cyanosis  GAIT:  Antalgic           Right Hip Exam:  RANGE OF MOTION:   FLEXION CONTRACTURE: None  FLEXION: 100 degrees  INTERNAL ROTATION: neutral degrees at 90 degrees of flexion   EXTERNAL ROTATION: 30 degrees at 90 degrees of flexion    PAIN WITH HIP MOTION: yes  PAIN WITH LOGROLL: yes  STINCHFIELD TEST: positive    KNEE EXAM: full knee ROM (0-120), stable to varus/valgus stress at terminal extension and 30 degrees     STRENGTH:  4/5 hip adduction, abduction, flexion. 5/5 knee flexion, extension. 5/5 ankle dorsiflexion and plantarflexion.     GREATER TROCHANTER BURSAL PAIN:  yes     REFLEXES:   PATELLAR 2+/4   " ACHILLES 2+/4    CLONUS: no  STRAIGHT LEG TEST:   negative    SENSATION TO LIGHT TOUCH:  DEEP PERONEAL/SUPERFICIAL PERONEAL/SURAL/SAPHENOUS/TIBIAL:  intact    EDEMA:   no  ERYTHEMA:  no  WOUNDS/INCISIONS: yes, lateral based incisions well-healed.      Left Hip Exam:   RANGE OF MOTION:   FLEXION CONTRACTURE: None   FLEXION: 110 degrees   INTERNAL ROTATION: 20 degrees at 90 degrees of flexion   EXTERNAL ROTATION: 40 degrees at 90 degrees of flexion    PAIN WITH HIP MOTION: no  PAIN WITH LOGROLL: no  STINCHFIELD TEST: negative    KNEE EXAM: full knee ROM (0-120), stable to varus/valgus stress at terminal extension and 30 degrees     STRENGTH:  5/5 hip adduction, abduction, flexion. 5/5 strength knee flexion, extension. 5/5 strength ankle dorsiflexion and plantarflexion.     GREATER TROCHANTER BURSAL PAIN:  no     REFLEXES:   PATELLAR 2+/4   ACHILLES 2+/4    CLONUS: negative  STRAIGHT LEG TEST:   negative    SENSATION TO LIGHT TOUCH:  DEEP PERONEAL/SUPERFICIAL PERONEAL/SURAL/SAPHENOUS/TIBIAL:  intact    EDEMA:   no  ERYTHEMA:  no  WOUNDS/INCISIONS: none, no overlying skin problems.      ------------------------------------------------------------------    LEG LENGTHS:  right leg shorter  _____________________________________________________  _____________________________________________________    RADIOGRAPHIC FINDINGS:   Radiographs from 10/5/18 were personally reviewed.  Radiographs demonstrate collapse of the percutaneously pinned femoral neck fracture with persistent radiolucency concerning for nonunion.  There is significant shortening of the right lower extremity relative to the left.     Assessment/Plan:   Diagnosis Plan   1. Closed displaced fracture of right femoral neck with delayed healing     2. Tobacco abuse     3. Leg length discrepancy     4. Osteopenia, unspecified location       I spent approximately 20-25 minutes face-to-face discussing with this patient the etiology of her ongoing hip pain.  30  minutes total were spent with the patient.  I explained treatment options for her delayed/nonhealing femoral neck fracture.  I discussed that she could be referred for surgical intervention for possible bone grafting and repeat fixation of her native hip versus proceeding with a right total hip arthroplasty.  Patient continues to be a nicotine user and explained that surgical intervention would not be performed as long as she continues to use nicotine.  She expressed understanding.  I explained what a total hip arthroplasty process would entail including what surgery, rehabilitation, and the hospital stay would entail.  Patient had several reservations giving her less than satisfactory postoperative process from her surgery in July.  She continued to reiterate that she needed to go slow with rehabilitation.  I explained that our physical therapist would work with her postoperatively to ensure that they are not exceeding her physical capabilities while at the same time pushing for her to regain normal function.  The patient continued to reiterate that she was concerned about the perceived misguided postop treatment that occurred with her last surgery and she wanted assurance that this would not occur.  I once again explained to her that we have protocols for total hip arthroplasty and that this would fall into the normal protocol.  She became immediately irritated and angry that I would not consider her a special case in which her mobility after surgery would be limited in terms of weightbearing because she had been nonweightbearing for so long after her July surgery.  I once again reassured her that part of the rehabilitation process would require her to immediately begin weightbearing as tolerated in explained to her that the understanding of muscle atrophy and weakness would be taken to account.  Despite this, the patient continued to reiterate that she wanted reassurance that previous mishaps would not occur  again.  Despite multiple reassurance that she became emotional and began tearing up stating that she has been through enough and does not want to go through these problems again.  Ultimately, I explained that the total hip protocol would be enacted as that is what is necessary for her to adequately rehabilitation.  She felt that I was being unreasonable and not adequately addressing her concerns.  As a result, patient wishes to think about her options.  She will follow-up with me on an as-needed basis.  If she wishes to pursue surgery, I will see her back on an as-needed basis.    I spent approximately 5-10 minutes counseling the patient regarding the adverse effects of tobacco use.  Included in this counseling session were treatment options for cessation including quitting cold turkey, medication, nicotine step-down programs, and smoking cessation programs.  Furthermore, I explained to the patient the importance of abstaining from nicotine usage as nicotine is known to increase the risk of complications associated with surgery including but not limited to infection, decreased wound healing, slowed soft tissue healing, and increased surgery associated pain.  As a result of this counseling session, the patient will weigh the options and determine how to proceed with achieving tobacco cessation in preparation for surgery.    Phani Peña MD  10/30/18  1:00 PM

## 2018-10-30 NOTE — TELEPHONE ENCOUNTER
Patient calling, wanting to speak to Hollie Chapa or nurse about ongoing hip problems. States she has been thrown all over the place and wanting to find out where she could go as far as a hip surgeon for help, wanting to stay in the [employer] family but the physician she seen today wasn't so friendly. Wanting to get Hollie's opinion. Please advise

## 2018-10-31 NOTE — TELEPHONE ENCOUNTER
Call PT and tell them Hollie is out of town currently, Lets wait until she is back to get her opinion. If she wants referred to a different Ortho now. Let me know. Duane

## 2018-10-31 NOTE — TELEPHONE ENCOUNTER
Called and spoke with patient, patient states she is quite unsatisfied with the orthopedic surgeon and would like a second opinion.  Advised she should see a specialist at ,  [facility].  Patient would like this referral.

## 2018-12-16 DIAGNOSIS — Z78.9 DEEP VEIN THROMBOSIS (DVT) PROPHYLAXIS PRESCRIBED AT DISCHARGE: ICD-10-CM

## 2018-12-17 RX ORDER — ASPIRIN 325 MG
325 TABLET, DELAYED RELEASE (ENTERIC COATED) ORAL DAILY
Qty: 30 TABLET | Refills: 0 | Status: SHIPPED | OUTPATIENT
Start: 2018-12-17 | End: 2019-01-22 | Stop reason: SDUPTHER

## 2019-01-22 DIAGNOSIS — Z78.9 DEEP VEIN THROMBOSIS (DVT) PROPHYLAXIS PRESCRIBED AT DISCHARGE: ICD-10-CM

## 2019-01-22 DIAGNOSIS — F41.9 ANXIETY: ICD-10-CM

## 2019-01-22 DIAGNOSIS — F17.200 SMOKING ADDICTION: ICD-10-CM

## 2019-01-22 RX ORDER — ASPIRIN 325 MG
325 TABLET, DELAYED RELEASE (ENTERIC COATED) ORAL DAILY
Qty: 30 TABLET | Refills: 0 | Status: SHIPPED | OUTPATIENT
Start: 2019-01-22 | End: 2019-02-19

## 2019-01-22 RX ORDER — BUPROPION HYDROCHLORIDE 150 MG/1
150 TABLET ORAL DAILY
Qty: 30 TABLET | Refills: 0 | Status: SHIPPED | OUTPATIENT
Start: 2019-01-22 | End: 2019-03-26 | Stop reason: SDUPTHER

## 2019-03-26 ENCOUNTER — OFFICE VISIT (OUTPATIENT)
Dept: FAMILY MEDICINE CLINIC | Facility: CLINIC | Age: 49
End: 2019-03-26

## 2019-03-26 VITALS
TEMPERATURE: 98.5 F | DIASTOLIC BLOOD PRESSURE: 64 MMHG | BODY MASS INDEX: 23.11 KG/M2 | WEIGHT: 126.4 LBS | OXYGEN SATURATION: 99 % | HEART RATE: 103 BPM | SYSTOLIC BLOOD PRESSURE: 106 MMHG

## 2019-03-26 DIAGNOSIS — F17.200 SMOKING ADDICTION: ICD-10-CM

## 2019-03-26 DIAGNOSIS — M80.00XG OSTEOPOROSIS WITH CURRENT PATHOLOGICAL FRACTURE WITH DELAYED HEALING, UNSPECIFIED OSTEOPOROSIS TYPE, SUBSEQUENT ENCOUNTER: ICD-10-CM

## 2019-03-26 DIAGNOSIS — Z96.641 STATUS POST RIGHT HIP REPLACEMENT: ICD-10-CM

## 2019-03-26 DIAGNOSIS — F41.9 ANXIETY: ICD-10-CM

## 2019-03-26 DIAGNOSIS — K59.03 DRUG-INDUCED CONSTIPATION: ICD-10-CM

## 2019-03-26 DIAGNOSIS — R11.0 NAUSEA: Primary | ICD-10-CM

## 2019-03-26 PROCEDURE — 99214 OFFICE O/P EST MOD 30 MIN: CPT | Performed by: NURSE PRACTITIONER

## 2019-03-26 RX ORDER — ONDANSETRON 4 MG/1
4 TABLET, FILM COATED ORAL EVERY 8 HOURS PRN
Qty: 30 TABLET | Refills: 0 | Status: SHIPPED | OUTPATIENT
Start: 2019-03-26 | End: 2019-09-26

## 2019-03-26 RX ORDER — OXYCODONE HYDROCHLORIDE 5 MG/1
TABLET ORAL
COMMUNITY
Start: 2019-03-22 | End: 2019-09-26

## 2019-03-26 RX ORDER — POLYETHYLENE GLYCOL 3350 17 G/17G
17 POWDER, FOR SOLUTION ORAL DAILY PRN
Qty: 225 G | Refills: 1 | Status: SHIPPED | OUTPATIENT
Start: 2019-03-26

## 2019-03-26 RX ORDER — GABAPENTIN 100 MG/1
CAPSULE ORAL
COMMUNITY
Start: 2019-03-22 | End: 2019-09-26 | Stop reason: ALTCHOICE

## 2019-03-26 RX ORDER — BUPROPION HYDROCHLORIDE 150 MG/1
150 TABLET ORAL DAILY
Qty: 30 TABLET | Refills: 11 | Status: SHIPPED | OUTPATIENT
Start: 2019-03-26 | End: 2020-05-08 | Stop reason: SDUPTHER

## 2019-03-26 RX ORDER — PSEUDOEPHED/ACETAMINOPH/DIPHEN 30MG-500MG
TABLET ORAL
COMMUNITY
Start: 2019-03-22 | End: 2019-09-26

## 2019-03-26 RX ORDER — OMEPRAZOLE 20 MG/1
CAPSULE, DELAYED RELEASE ORAL
COMMUNITY
Start: 2019-03-22 | End: 2019-09-26

## 2019-03-26 RX ORDER — TRAMADOL HYDROCHLORIDE 50 MG/1
TABLET ORAL
COMMUNITY
Start: 2019-03-22 | End: 2019-09-26

## 2019-03-26 NOTE — PROGRESS NOTES
Subjective   Anthony Chau is a 48 y.o. female.   Chief Complaint   Patient presents with   • Anxiety     Refill on Wellbutrin       History of Present Illness   Patient is here for follow up for osteoporosis, she suffered a right hip fracture in July of 2018, had poor healing, was started on Forteo injections here. Continued to have poor healing, had right hip replacement 3/21/19, at [facility] is having some nausea with medications.   States she got off schedule with wellbutrin, has been taking regularly for at least 2 weeks. Wants to continue, states it is working well to manage anxiety and cigarette cravings, last cigarette was one week ago, smokes rarely  The following portions of the patient's history were reviewed and updated as appropriate: allergies, current medications, past medical history, past social history, past surgical history and problem list.    Review of Systems   Constitutional: Negative for chills and fever.   Respiratory: Negative for cough, shortness of breath and wheezing.    Cardiovascular: Negative for chest pain and palpitations.   Gastrointestinal: Positive for constipation and nausea (occ with meds). Negative for blood in stool, diarrhea and vomiting.   Musculoskeletal: Positive for arthralgias.   Neurological: Negative for dizziness and headaches.   Psychiatric/Behavioral: Negative for sleep disturbance. The patient is nervous/anxious.        Objective   Physical Exam   Constitutional: She is oriented to person, place, and time. She appears well-developed and well-nourished. No distress.   HENT:   Head: Normocephalic.   Right Ear: External ear normal.   Left Ear: External ear normal.   Eyes: Conjunctivae are normal. No scleral icterus.   Cardiovascular: Normal rate, regular rhythm and normal heart sounds.   No murmur heard.  Pulmonary/Chest: Effort normal and breath sounds normal. No stridor. No respiratory distress. She has no wheezes.   Musculoskeletal: She exhibits edema (right hip edema  and bruising).   Neurological: She is alert and oriented to person, place, and time.   Skin: Skin is warm and dry. Capillary refill takes less than 2 seconds. She is not diaphoretic.   Bruising right hip   Psychiatric: She has a normal mood and affect. Her behavior is normal. Judgment and thought content normal.   Vitals reviewed.      Assessment/Plan   Anthony was seen today for anxiety.    Diagnoses and all orders for this visit:    Nausea  -     ondansetron (ZOFRAN) 4 MG tablet; Take 1 tablet by mouth Every 8 (Eight) Hours As Needed for Nausea or Vomiting.    Anxiety  -     buPROPion XL (WELLBUTRIN XL) 150 MG 24 hr tablet; Take 1 tablet by mouth Daily.    Smoking addiction  -     buPROPion XL (WELLBUTRIN XL) 150 MG 24 hr tablet; Take 1 tablet by mouth Daily.    Osteoporosis with current pathological fracture with delayed healing, unspecified osteoporosis type, subsequent encounter  -     teriparatide (FORTEO) 600 MCG/2.4ML injection; Inject 0.08 mL under the skin into the appropriate area as directed Daily.    Status post right hip replacement  -     teriparatide (FORTEO) 600 MCG/2.4ML injection; Inject 0.08 mL under the skin into the appropriate area as directed Daily.    Drug-induced constipation  -     polyethylene glycol (MIRALAX) powder; Take 17 g by mouth Daily As Needed (constipation).        Zofran prescribed for nausea  Anxiety is controlled, continue wellbutrin  Encouraged patient to stop smoking. Smoking cessation advised.  Pt voiced understanding of health risks of continued smoking.  2 samples of Forteo given to patient today, refill sent in, will try to get PA again.  Miralax prescribed for constipation, continue daily colace.  Patient was encouraged to keep me informed of any acute changes, lack of improvement, or any new concerning symptoms.Patient voiced understanding of all instructions and denied further questions.

## 2019-03-27 ENCOUNTER — PRIOR AUTHORIZATION (OUTPATIENT)
Dept: FAMILY MEDICINE CLINIC | Facility: CLINIC | Age: 49
End: 2019-03-27

## 2019-03-27 ENCOUNTER — TELEPHONE (OUTPATIENT)
Dept: FAMILY MEDICINE CLINIC | Facility: CLINIC | Age: 49
End: 2019-03-27

## 2019-03-27 NOTE — TELEPHONE ENCOUNTER
PA submitted for Forteo 600 MCG/2.4ML Solution  Key# [id number]  Diagnosis: M80.00XG- Osteoporosis   Awaiting response from [employer].

## 2019-05-28 ENCOUNTER — OFFICE VISIT (OUTPATIENT)
Dept: FAMILY MEDICINE CLINIC | Facility: CLINIC | Age: 49
End: 2019-05-28

## 2019-05-28 VITALS
BODY MASS INDEX: 23.52 KG/M2 | DIASTOLIC BLOOD PRESSURE: 78 MMHG | HEIGHT: 62 IN | SYSTOLIC BLOOD PRESSURE: 126 MMHG | HEART RATE: 84 BPM | WEIGHT: 127.8 LBS | OXYGEN SATURATION: 98 %

## 2019-05-28 DIAGNOSIS — M85.9 LOW BONE DENSITY: ICD-10-CM

## 2019-05-28 DIAGNOSIS — F41.8 DEPRESSION WITH ANXIETY: Primary | ICD-10-CM

## 2019-05-28 DIAGNOSIS — E55.9 VITAMIN D DEFICIENCY: ICD-10-CM

## 2019-05-28 DIAGNOSIS — Z87.81 HX OF FRACTURE OF HIP: ICD-10-CM

## 2019-05-28 DIAGNOSIS — Z00.00 HEALTHCARE MAINTENANCE: ICD-10-CM

## 2019-05-28 DIAGNOSIS — L98.9 SKIN LESION OF BACK: ICD-10-CM

## 2019-05-28 DIAGNOSIS — Z85.828 HISTORY OF BASAL CELL CANCER: ICD-10-CM

## 2019-05-28 PROCEDURE — 99214 OFFICE O/P EST MOD 30 MIN: CPT | Performed by: NURSE PRACTITIONER

## 2019-05-28 RX ORDER — NYSTATIN AND TRIAMCINOLONE ACETONIDE 100000; 1 [USP'U]/G; MG/G
OINTMENT TOPICAL 2 TIMES DAILY
Qty: 15 G | Refills: 0 | Status: SHIPPED | OUTPATIENT
Start: 2019-05-28 | End: 2019-09-26

## 2019-05-28 NOTE — PROGRESS NOTES
Subjective   Anthony Chau is a 49 y.o. female.   Chief Complaint   Patient presents with   • Anxiety     Follow-up       History of Present Illness   Patient is here for follow up, strength is improving, has 2 more PT sessions, right hip replacement in March, 2019, doing well.  States she is taking the wellbutrin, anxiety is controlled. Is smoking no more than 1 cigarette a day.   Has new complaint of itchy lesion on left shoulder blade, been there for 2 years approximately, just started itching about 2 months ago. Has history of skin cancer, has not seen a dermatologist in a few years.  The following portions of the patient's history were reviewed and updated as appropriate: allergies and current medications.    Review of Systems   Constitutional: Positive for activity change. Negative for chills, fatigue and fever.   Respiratory: Negative for shortness of breath.    Cardiovascular: Negative for chest pain.   Musculoskeletal: Positive for arthralgias and myalgias. Negative for gait problem and joint swelling.   Skin: Positive for color change (left shoulder, red, itchy lesion).   Neurological: Negative for weakness and numbness.   Psychiatric/Behavioral: Negative for self-injury and suicidal ideas. The patient is nervous/anxious.        Objective   Physical Exam   Constitutional: She is oriented to person, place, and time. She appears well-developed and well-nourished. No distress.   HENT:   Head: Normocephalic and atraumatic.   Right Ear: External ear normal.   Left Ear: External ear normal.   Cardiovascular: Normal rate and regular rhythm.   No murmur heard.  Pulmonary/Chest: Effort normal and breath sounds normal. No stridor. No respiratory distress. She has no wheezes. She has no rales.   Neurological: She is alert and oriented to person, place, and time.   Skin: Skin is warm and dry. Capillary refill takes less than 2 seconds. She is not diaphoretic.   1 CM ROUND RED, FLAKY FLAT LESION ON LEFT SHOULDER  BLADE.   Psychiatric: She has a normal mood and affect. Her behavior is normal. Thought content normal.   Vitals reviewed.      Assessment/Plan   Anthony was seen today for anxiety.    Diagnoses and all orders for this visit:    Depression with anxiety    Skin lesion of back  -     Ambulatory Referral to Dermatology  -     nystatin-triamcinolone (MYCOLOG) 894471-2.1 UNIT/GM-% ointment; Apply  topically to the appropriate area as directed 2 (Two) Times a Day.    History of basal cell cancer  -     Ambulatory Referral to Dermatology    Vitamin D deficiency  -     Vitamin D 25 Hydroxy; Future    Healthcare maintenance  -     CBC Auto Differential; Future  -     Comprehensive Metabolic Panel; Future  -     Lipid Panel; Future  -     TSH; Future    Hx of fracture of hip    Low bone density        Depression and anxiety are controlled, continue wellbutrin  Referred to derm for evaluation of skin lesion and for skin check in patient with hx of skin cancer. Prescribed mycolog for possible fungal infection.  Screening labs ordered to evaluate chronic conditions. I will contact patient regarding test results and provide instructions regarding any necessary changes in plan of care.  Continue forteo to improve bone density.  Patient was encouraged to keep me informed of any acute changes, lack of improvement, or any new concerning symptoms.

## 2019-09-26 ENCOUNTER — APPOINTMENT (OUTPATIENT)
Dept: LAB | Facility: HOSPITAL | Age: 49
End: 2019-09-26

## 2019-09-26 ENCOUNTER — OFFICE VISIT (OUTPATIENT)
Dept: FAMILY MEDICINE CLINIC | Facility: CLINIC | Age: 49
End: 2019-09-26

## 2019-09-26 VITALS
OXYGEN SATURATION: 99 % | DIASTOLIC BLOOD PRESSURE: 84 MMHG | BODY MASS INDEX: 23.63 KG/M2 | SYSTOLIC BLOOD PRESSURE: 116 MMHG | WEIGHT: 128.4 LBS | HEART RATE: 80 BPM | HEIGHT: 62 IN

## 2019-09-26 DIAGNOSIS — Z12.4 CERVICAL CANCER SCREENING: ICD-10-CM

## 2019-09-26 DIAGNOSIS — Z23 NEEDS FLU SHOT: ICD-10-CM

## 2019-09-26 DIAGNOSIS — F41.9 ANXIETY: ICD-10-CM

## 2019-09-26 DIAGNOSIS — Z00.00 ANNUAL PHYSICAL EXAM: Primary | ICD-10-CM

## 2019-09-26 DIAGNOSIS — E55.9 VITAMIN D DEFICIENCY: ICD-10-CM

## 2019-09-26 DIAGNOSIS — M81.0 OSTEOPOROSIS, UNSPECIFIED OSTEOPOROSIS TYPE, UNSPECIFIED PATHOLOGICAL FRACTURE PRESENCE: ICD-10-CM

## 2019-09-26 DIAGNOSIS — Z00.00 HEALTHCARE MAINTENANCE: ICD-10-CM

## 2019-09-26 DIAGNOSIS — Z12.39 BREAST CANCER SCREENING: ICD-10-CM

## 2019-09-26 LAB
ALBUMIN SERPL-MCNC: 5.1 G/DL (ref 3.5–5.2)
ALBUMIN/GLOB SERPL: 2.2 G/DL
ALP SERPL-CCNC: 88 U/L (ref 39–117)
ALT SERPL W P-5'-P-CCNC: 11 U/L (ref 1–33)
ANION GAP SERPL CALCULATED.3IONS-SCNC: 13.8 MMOL/L (ref 5–15)
AST SERPL-CCNC: 18 U/L (ref 1–32)
BASOPHILS # BLD AUTO: 0.06 10*3/MM3 (ref 0–0.2)
BASOPHILS NFR BLD AUTO: 0.6 % (ref 0–1.5)
BILIRUB SERPL-MCNC: 0.2 MG/DL (ref 0.2–1.2)
BUN BLD-MCNC: 9 MG/DL (ref 6–20)
BUN/CREAT SERPL: 8.4 (ref 7–25)
CALCIUM SPEC-SCNC: 9.4 MG/DL (ref 8.6–10.5)
CHLORIDE SERPL-SCNC: 101 MMOL/L (ref 98–107)
CHOLEST SERPL-MCNC: 289 MG/DL (ref 0–200)
CO2 SERPL-SCNC: 26.2 MMOL/L (ref 22–29)
CREAT BLD-MCNC: 1.07 MG/DL (ref 0.57–1)
DEPRECATED RDW RBC AUTO: 43.7 FL (ref 37–54)
EOSINOPHIL # BLD AUTO: 0.09 10*3/MM3 (ref 0–0.4)
EOSINOPHIL NFR BLD AUTO: 0.9 % (ref 0.3–6.2)
ERYTHROCYTE [DISTWIDTH] IN BLOOD BY AUTOMATED COUNT: 12.8 % (ref 12.3–15.4)
GFR SERPL CREATININE-BSD FRML MDRD: 55 ML/MIN/1.73
GLOBULIN UR ELPH-MCNC: 2.3 GM/DL
GLUCOSE BLD-MCNC: 88 MG/DL (ref 65–99)
HCT VFR BLD AUTO: 44.4 % (ref 34–46.6)
HDLC SERPL-MCNC: 70 MG/DL (ref 40–60)
HGB BLD-MCNC: 15.1 G/DL (ref 12–15.9)
IMM GRANULOCYTES # BLD AUTO: 0.04 10*3/MM3 (ref 0–0.05)
IMM GRANULOCYTES NFR BLD AUTO: 0.4 % (ref 0–0.5)
LDLC SERPL CALC-MCNC: 196 MG/DL (ref 0–100)
LDLC/HDLC SERPL: 2.79 {RATIO}
LYMPHOCYTES # BLD AUTO: 2.92 10*3/MM3 (ref 0.7–3.1)
LYMPHOCYTES NFR BLD AUTO: 27.8 % (ref 19.6–45.3)
MCH RBC QN AUTO: 31.9 PG (ref 26.6–33)
MCHC RBC AUTO-ENTMCNC: 34 G/DL (ref 31.5–35.7)
MCV RBC AUTO: 93.9 FL (ref 79–97)
MONOCYTES # BLD AUTO: 0.65 10*3/MM3 (ref 0.1–0.9)
MONOCYTES NFR BLD AUTO: 6.2 % (ref 5–12)
NEUTROPHILS # BLD AUTO: 6.75 10*3/MM3 (ref 1.7–7)
NEUTROPHILS NFR BLD AUTO: 64.1 % (ref 42.7–76)
NRBC BLD AUTO-RTO: 0 /100 WBC (ref 0–0.2)
PLATELET # BLD AUTO: 271 10*3/MM3 (ref 140–450)
PMV BLD AUTO: 11.1 FL (ref 6–12)
POTASSIUM BLD-SCNC: 4.9 MMOL/L (ref 3.5–5.2)
PROT SERPL-MCNC: 7.4 G/DL (ref 6–8.5)
RBC # BLD AUTO: 4.73 10*6/MM3 (ref 3.77–5.28)
SODIUM BLD-SCNC: 141 MMOL/L (ref 136–145)
TRIGL SERPL-MCNC: 117 MG/DL (ref 0–150)
TSH SERPL DL<=0.05 MIU/L-ACNC: 1.25 UIU/ML (ref 0.27–4.2)
VLDLC SERPL-MCNC: 23.4 MG/DL (ref 5–40)
WBC NRBC COR # BLD: 10.51 10*3/MM3 (ref 3.4–10.8)

## 2019-09-26 PROCEDURE — 82306 VITAMIN D 25 HYDROXY: CPT | Performed by: NURSE PRACTITIONER

## 2019-09-26 PROCEDURE — 80061 LIPID PANEL: CPT | Performed by: NURSE PRACTITIONER

## 2019-09-26 PROCEDURE — 90471 IMMUNIZATION ADMIN: CPT | Performed by: NURSE PRACTITIONER

## 2019-09-26 PROCEDURE — 90686 IIV4 VACC NO PRSV 0.5 ML IM: CPT | Performed by: NURSE PRACTITIONER

## 2019-09-26 PROCEDURE — 99396 PREV VISIT EST AGE 40-64: CPT | Performed by: NURSE PRACTITIONER

## 2019-09-26 PROCEDURE — 80050 GENERAL HEALTH PANEL: CPT | Performed by: NURSE PRACTITIONER

## 2019-09-26 NOTE — PATIENT INSTRUCTIONS
Breast Self-Awareness  Breast self-awareness means:  · Knowing how your breasts look.  · Knowing how your breasts feel.  · Checking your breasts every month for changes.  · Telling your doctor if you notice a change in your breasts.  Breast self-awareness allows you to notice a breast problem early while it is still small.  How to do a breast self-exam  One way to learn what is normal for your breasts and to check for changes is to do a breast self-exam. To do a breast self-exam:  Look for Changes    1. Take off all the clothes above your waist.  2.  front of a mirror in a room with good lighting.  3. Put your hands on your hips.  4. Push your hands down.  5. Look at your breasts and nipples in the mirror to see if one breast or nipple looks different than the other. Check to see if:  ? The shape of one breast is different.  ? The size of one breast is different.  ? There are wrinkles, dips, and bumps in one breast and not the other.  6. Look at each breast for changes in your skin, such as:  ? Redness.  ? Scaly areas.  7. Look for changes in your nipples, such as:  ? Liquid around the nipples.  ? Bleeding.  ? Dimpling.  ? Redness.  ? A change in where the nipples are.  Feel for Changes  1. Lie on your back on the floor.  2. Feel each breast. To do this, follow these steps:  ? Pick a breast to feel.  ? Put the arm closest to that breast above your head.  ? Use your other arm to feel the nipple area of your breast. Feel the area with the pads of your three middle fingers by making small circles with your fingers. For the first [place], press lightly. For the second [place], press harder. For the third [place], press even harder.  ? Keep making circles with your fingers at the light, harder, and even harder pressures as you move down your breast. Stop when you feel your ribs.  ? Move your fingers a little toward the center of your body.  ? Start making circles with your fingers again, this time going up until you  reach your collarbone.  ? Keep making up and down circles until you reach your armpit. Remember to keep using the three pressures.  ? Feel the other breast in the same way.  3. Sit or  the shower or tub.  4. With soapy water on your skin, feel each breast the same way you did in step 2, when you were lying on the floor.  Write Down What You Find  After doing the self-exam, write down:  · What is normal for each breast.  · Any changes you find in each breast.  · When you last had your period.    How often should I check my breasts?  Check your breasts every month. If you are breastfeeding, the best time to check them is after you feed your baby or after you use a breast pump. If you get periods, the best time to check your breasts is 5-7 days after your period is over.  When should I see my doctor?  See your doctor if you notice:  · A change in shape or size of your breasts or nipples.  · A change in the skin of your breast or nipples, such as red or scaly skin.  · Unusual fluid coming from your nipples.  · A lump or thick area that was not there before.  · Pain in your breasts.  · Anything that concerns you.  This information is not intended to replace advice given to you by your health care provider. Make sure you discuss any questions you have with your health care provider.  Document Released: 06/05/2009 Document Revised: 05/25/2017 Document Reviewed: 11/06/2016  [employer] Interactive Patient Education © 2019 [employer]    Heart-Healthy Eating Plan  Heart-healthy meal planning includes:  · Eating less unhealthy fats.  · Eating more healthy fats.  · Making other changes in your diet.  Talk with your doctor or a diet specialist (dietitian) to create an eating plan that is right for you.  What is my plan?  Your doctor may recommend an eating plan that includes:  · Total fat: ______% or less of total calories a day.  · Saturated fat: ______% or less of total calories a day.  · Cholesterol: less than  _________mg a day.  What are tips for following this plan?  Cooking  Avoid frying your food. Try to bake, boil, grill, or broil it instead. You can also reduce fat by:  · Removing the skin from poultry.  · Removing all visible fats from meats.  · Steaming vegetables in water or broth.  Meal planning    · At meals, divide your plate into four equal parts:  ? Fill one-half of your plate with vegetables and green salads.  ? Fill one-fourth of your plate with whole grains.  ? Fill one-fourth of your plate with lean protein foods.  · Eat 4-5 servings of vegetables per day. A serving of vegetables is:  ? 1 cup of raw or cooked vegetables.  ? 2 cups of raw leafy greens.  · Eat 4-5 servings of fruit per day. A serving of fruit is:  ? 1 medium whole fruit.  ? ¼ cup of dried fruit.  ? ½ cup of fresh, frozen, or canned fruit.  ? ½ cup of 100% fruit juice.  · Eat more foods that have soluble fiber. These are apples, broccoli, carrots, beans, peas, and barley. Try to get 20-30 g of fiber per day.  · Eat 4-5 servings of nuts, legumes, and seeds per week:  ? 1 serving of dried beans or legumes equals ½ cup after being cooked.  ? 1 serving of nuts is ¼ cup.  ? 1 serving of seeds equals 1 tablespoon.  General information  · Eat more home-cooked food. Eat less restaurant, buffet, and fast food.  · Limit or avoid alcohol.  · Limit foods that are high in starch and sugar.  · Avoid fried foods.  · Lose weight if you are overweight.  · Keep track of how much salt (sodium) you eat. This is important if you have high blood pressure. Ask your doctor to tell you more about this.  · Try to add vegetarian meals each week.  Fats  · Choose healthy fats. These include olive oil and canola oil, flaxseeds, walnuts, almonds, and seeds.  · Eat more omega-3 fats. These include salmon, mackerel, sardines, tuna, flaxseed oil, and ground flaxseeds. Try to eat fish at least 2 times each week.  · Check food labels. Avoid foods with trans fats or high  amounts of saturated fat.  · Limit saturated fats.  ? These are often found in animal products, such as meats, butter, and cream.  ? These are also found in plant foods, such as palm oil, palm kernel oil, and coconut oil.  · Avoid foods with partially hydrogenated oils in them. These have trans fats. Examples are stick margarine, some tub margarines, cookies, crackers, and other baked goods.  What foods can I eat?  Fruits  All fresh, canned (in natural juice), or frozen fruits.  Vegetables  Fresh or frozen vegetables (raw, steamed, roasted, or grilled). Green salads.  Grains  Most grains. Choose whole wheat and whole grains most of the time. Rice and pasta, including brown rice and pastas made with whole wheat.  Meats and other proteins  Lean, well-trimmed beef, veal, pork, and lamb. Chicken and turkey without skin. All fish and shellfish. Wild duck, rabbit, pheasant, and venison. Egg whites or low-cholesterol egg substitutes. Dried beans, peas, lentils, and tofu. Seeds and most nuts.  Dairy  Low-fat or nonfat cheeses, including ricotta and mozzarella. Skim or 1% milk that is liquid, powdered, or evaporated. Buttermilk that is made with low-fat milk. Nonfat or low-fat yogurt.  Fats and oils  Non-hydrogenated (trans-free) margarines. Vegetable oils, including soybean, sesame, sunflower, olive, peanut, safflower, corn, canola, and cottonseed. Salad dressings or mayonnaise made with a vegetable oil.  Beverages  Mineral water. Coffee and tea. Diet carbonated beverages.  Sweets and desserts  Sherbet, gelatin, and fruit ice. Small amounts of dark chocolate.  Limit all sweets and desserts.  Seasonings and condiments  All seasonings and condiments.  The items listed above may not be a complete list of foods and drinks you can eat. Contact a dietitian for more options.  What foods should I avoid?  Fruits  Canned fruit in heavy syrup. Fruit in cream or butter sauce. Fried fruit. Limit coconut.  Vegetables  Vegetables cooked  in cheese, cream, or butter sauce. Fried vegetables.  Grains  Breads that are made with saturated or trans fats, oils, or whole milk. Croissants. Sweet rolls. Donuts. High-fat crackers, such as cheese crackers.  Meats and other proteins  Fatty meats, such as hot dogs, ribs, sausage, fernández, rib-eye roast or steak. High-fat deli meats, such as salami and bologna. Caviar. Domestic duck and goose. Organ meats, such as liver.  Dairy  Cream, sour cream, cream cheese, and creamed cottage cheese. Whole-milk cheeses. Whole or 2% milk that is liquid, evaporated, or condensed. Whole buttermilk. Cream sauce or high-fat cheese sauce. Yogurt that is made from whole milk.  Fats and oils  Meat fat, or shortening. Cocoa butter, hydrogenated oils, palm oil, coconut oil, palm kernel oil. Solid fats and shortenings, including fernández fat, salt pork, lard, and butter. Nondairy cream substitutes. Salad dressings with cheese or sour cream.  Beverages  Regular sodas and juice drinks with added sugar.  Sweets and desserts  Frosting. Pudding. Cookies. Cakes. Pies. Milk chocolate or white chocolate. Buttered syrups. Full-fat ice cream or ice cream drinks.  The items listed above may not be a complete list of foods and drinks to avoid. Contact a dietitian for more information.  Summary  · Heart-healthy meal planning includes eating less unhealthy fats, eating more healthy fats, and making other changes in your diet.  · Eat a balanced diet. This includes fruits and vegetables, low-fat or nonfat dairy, lean protein, nuts and legumes, whole grains, and heart-healthy oils and fats.  This information is not intended to replace advice given to you by your health care provider. Make sure you discuss any questions you have with your health care provider.  Document Released: 06/18/2013 Document Revised: 01/25/2019 Document Reviewed: 01/25/2019  [employer] Interactive Patient Education © 2019 [employer]    Health Maintenance for Postmenopausal  Women  Menopause is a normal process in which your reproductive ability comes to an end. This process happens gradually over a span of months to years, usually between the ages of 48 and 55. Menopause is complete when you have missed 12 consecutive menstrual periods.  It is important to talk with your health care provider about some of the most common conditions that affect postmenopausal women, such as heart disease, cancer, and bone loss (osteoporosis). Adopting a healthy lifestyle and getting preventive care can help to promote your health and wellness. Those actions can also lower your chances of developing some of these common conditions.  What should I know about menopause?  During menopause, you may experience a number of symptoms, such as:  · Moderate-to-severe hot flashes.  · Night sweats.  · Decrease in sex drive.  · Mood swings.  · Headaches.  · Tiredness.  · Irritability.  · Memory problems.  · Insomnia.  Choosing to treat or not to treat menopausal changes is an individual decision that you make with your health care provider.  What should I know about hormone replacement therapy and supplements?  Hormone therapy products are effective for treating symptoms that are associated with menopause, such as hot flashes and night sweats. Hormone replacement carries certain risks, especially as you become older. If you are thinking about using estrogen or estrogen with progestin treatments, discuss the benefits and risks with your health care provider.  What should I know about heart disease and stroke?  Heart disease, heart attack, and stroke become more likely as you age. This may be due, in part, to the hormonal changes that your body experiences during menopause. These can affect how your body processes dietary fats, triglycerides, and cholesterol. Heart attack and stroke are both medical emergencies.  There are many things that you can do to help prevent heart disease and stroke:  · Have your blood pressure  checked at least every 1-2 years. High blood pressure causes heart disease and increases the risk of stroke.  · If you are 55-79 years old, ask your health care provider if you should take aspirin to prevent a heart attack or a stroke.  · Do not use any tobacco products, including cigarettes, chewing tobacco, or electronic cigarettes. If you need help quitting, ask your health care provider.  · It is important to eat a healthy diet and maintain a healthy weight.  ? Be sure to include plenty of vegetables, fruits, low-fat dairy products, and lean protein.  ? Avoid eating foods that are high in solid fats, added sugars, or salt (sodium).  · Get regular exercise. This is one of the most important things that you can do for your health.  ? Try to exercise for at least 150 minutes each week. The type of exercise that you do should increase your heart rate and make you sweat. This is known as moderate-intensity exercise.  ? Try to do strengthening exercises at least twice each week. Do these in addition to the moderate-intensity exercise.  · Know your numbers. Ask your health care provider to check your cholesterol and your blood glucose. Continue to have your blood tested as directed by your health care provider.    What should I know about cancer screening?  There are several types of cancer. Take the following steps to reduce your risk and to catch any cancer development as early as possible.  Breast Cancer  · Practice breast self-awareness.  ? This means understanding how your breasts normally appear and feel.  ? It also means doing regular breast self-exams. Let your health care provider know about any changes, no matter how small.  · If you are 40 or older, have a clinician do a breast exam (clinical breast exam or CBE) every year. Depending on your age, family history, and medical history, it may be recommended that you also have a yearly breast X-ray (mammogram).  · If you have a family history of breast cancer,  talk with your health care provider about genetic screening.  · If you are at high risk for breast cancer, talk with your health care provider about having an MRI and a mammogram every year.  · Breast cancer (BRCA) gene test is recommended for women who have family members with BRCA-related cancers. Results of the assessment will determine the need for genetic counseling and BRCA1 and for BRCA2 testing. BRCA-related cancers include these types:  ? Breast. This occurs in males or females.  ? Ovarian.  ? Tubal. This may also be called fallopian tube cancer.  ? Cancer of the abdominal or pelvic lining (peritoneal cancer).  ? Prostate.  ? Pancreatic.  Cervical, Uterine, and Ovarian Cancer  Your health care provider may recommend that you be screened regularly for cancer of the pelvic organs. These include your ovaries, uterus, and vagina. This screening involves a pelvic exam, which includes checking for microscopic changes to the surface of your cervix (Pap test).  · For women ages 21-65, health care providers may recommend a pelvic exam and a Pap test every three years. For women ages 30-65, they may recommend the Pap test and pelvic exam, combined with testing for human papilloma virus (HPV), every five years. Some types of HPV increase your risk of cervical cancer. Testing for HPV may also be done on women of any age who have unclear Pap test results.  · Other health care providers may not recommend any screening for nonpregnant women who are considered low risk for pelvic cancer and have no symptoms. Ask your health care provider if a screening pelvic exam is right for you.  · If you have had past treatment for cervical cancer or a condition that could lead to cancer, you need Pap tests and screening for cancer for at least 20 years after your treatment. If Pap tests have been discontinued for you, your risk factors (such as having a new sexual partner) need to be reassessed to determine if you should start having  screenings again. Some women have medical problems that increase the chance of getting cervical cancer. In these cases, your health care provider may recommend that you have screening and Pap tests more often.  · If you have a family history of uterine cancer or ovarian cancer, talk with your health care provider about genetic screening.  · If you have vaginal bleeding after reaching menopause, tell your health care provider.  · There are currently no reliable tests available to screen for ovarian cancer.  Lung Cancer  Lung cancer screening is recommended for adults 55-80 years old who are at high risk for lung cancer because of a history of smoking. A yearly low-dose CT scan of the lungs is recommended if you:  · Currently smoke.  · Have a history of at least 30 pack-years of smoking and you currently smoke or have quit within the past 15 years. A pack-year is smoking an average of one pack of cigarettes per day for one year.  Yearly screening should:  · Continue until it has been 15 years since you quit.  · Stop if you develop a health problem that would prevent you from having lung cancer treatment.  Colorectal Cancer  · This type of cancer can be detected and can often be prevented.  · Routine colorectal cancer screening usually begins at age 50 and continues through age 75.  · If you have risk factors for colon cancer, your health care provider may recommend that you be screened at an earlier age.  · If you have a family history of colorectal cancer, talk with your health care provider about genetic screening.  · Your health care provider may also recommend using home test kits to check for hidden blood in your stool.  · A small camera at the end of a tube can be used to examine your colon directly (sigmoidoscopy or colonoscopy). This is done to check for the earliest forms of colorectal cancer.  · Direct examination of the colon should be repeated every 5-10 years until age 75. However, if early forms of  precancerous polyps or small growths are found or if you have a family history or genetic risk for colorectal cancer, you may need to be screened more often.  Skin Cancer  · Check your skin from head to toe regularly.  · Monitor any moles. Be sure to tell your health care provider:  ? About any new moles or changes in moles, especially if there is a change in a mole's shape or color.  ? If you have a mole that is larger than the size of a pencil eraser.  · If any of your family members has a history of skin cancer, especially at a young age, talk with your health care provider about genetic screening.  · Always use sunscreen. Apply sunscreen liberally and repeatedly throughout the day.  · Whenever you are outside, protect yourself by wearing long sleeves, pants, a wide-brimmed hat, and sunglasses.  What should I know about osteoporosis?  Osteoporosis is a condition in which bone destruction happens more quickly than new bone creation. After menopause, you may be at an increased risk for osteoporosis. To help prevent osteoporosis or the bone fractures that can happen because of osteoporosis, the following is recommended:  · If you are 19-50 years old, get at least 1,000 mg of calcium and at least 600 mg of vitamin D per day.  · If you are older than age 50 but younger than age 70, get at least 1,200 mg of calcium and at least 600 mg of vitamin D per day.  · If you are older than age 70, get at least 1,200 mg of calcium and at least 800 mg of vitamin D per day.  Smoking and excessive alcohol intake increase the risk of osteoporosis. Eat foods that are rich in calcium and vitamin D, and do weight-bearing exercises several times each week as directed by your health care provider.  What should I know about how menopause affects my mental health?  Depression may occur at any age, but it is more common as you become older. Common symptoms of depression include:  · Low or sad mood.  · Changes in sleep patterns.  · Changes  in appetite or eating patterns.  · Feeling an overall lack of motivation or enjoyment of activities that you previously enjoyed.  · Frequent crying spells.  Talk with your health care provider if you think that you are experiencing depression.  What should I know about immunizations?  It is important that you get and maintain your immunizations. These include:  · Tetanus, diphtheria, and pertussis (Tdap) booster vaccine.  · Influenza every year before the flu season begins.  · Pneumonia vaccine.  · Shingles vaccine.  Your health care provider may also recommend other immunizations.  This information is not intended to replace advice given to you by your health care provider. Make sure you discuss any questions you have with your health care provider.  Document Released: 02/09/2007 Document Revised: 07/07/2017 Document Reviewed: 09/20/2016  [employer] Interactive Patient Education © 2019 [employer]    Perimenopause    Perimenopause is the normal time of life before and after menstrual periods stop completely (menopause). Perimenopause can begin 2-8 years before menopause, and it usually lasts for 1 year after menopause. During perimenopause, the ovaries may or may not produce an egg.  What are the causes?  This condition is caused by a natural change in hormone levels that happens as you get older.  What increases the risk?  This condition is more likely to start at an earlier age if you have certain medical conditions or treatments, including:  · A tumor of the pituitary gland in the brain.  · A disease that affects the ovaries and hormone production.  · Radiation treatment for cancer.  · Certain cancer treatments, such as chemotherapy or hormone (anti-estrogen) therapy.  · Heavy smoking and excessive alcohol use.  · Family history of early menopause.  What are the signs or symptoms?  Perimenopausal changes affect each woman differently. Symptoms of this condition may include:  · Hot flashes.  · Night  sweats.  · Irregular menstrual periods.  · Decreased sex drive.  · Vaginal dryness.  · Headaches.  · Mood swings.  · Depression.  · Memory problems or trouble concentrating.  · Irritability.  · Tiredness.  · Weight gain.  · Anxiety.  · Trouble getting pregnant.  How is this diagnosed?  This condition is diagnosed based on your medical history, a physical exam, your age, your menstrual history, and your symptoms. Hormone tests may also be done.  How is this treated?  In some cases, no treatment is needed. You and your health care provider should make a decision together about whether treatment is necessary. Treatment will be based on your individual condition and preferences. Various treatments are available, such as:  · Menopausal hormone therapy (MHT).  · Medicines to treat specific symptoms.  · Acupuncture.  · Vitamin or herbal supplements.  Before starting treatment, make sure to let your health care provider know if you have a personal or family history of:  · Heart disease.  · Breast cancer.  · Blood clots.  · Diabetes.  · Osteoporosis.  Follow these instructions at home:  Lifestyle  · Do not use any products that contain nicotine or tobacco, such as cigarettes and e-cigarettes. If you need help quitting, ask your health care provider.  · Eat a balanced diet that includes fresh fruits and vegetables, whole grains, soybeans, eggs, lean meat, and low-fat dairy.  · Get at least 30 minutes of physical activity on 5 or more days each week.  · Avoid alcoholic and caffeinated beverages, as well as spicy foods. This may help prevent hot flashes.  · Get 7-8 hours of sleep each night.  · Dress in layers that can be removed to help you manage hot flashes.  · Find ways to manage stress, such as deep breathing, meditation, or journaling.  General instructions  · Keep track of your menstrual periods, including:  ? When they occur.  ? How heavy they are and how long they last.  ? How much time passes between periods.  · Keep  track of your symptoms, noting when they start, how often you have them, and how long they last.  · Take over-the-counter and prescription medicines only as told by your health care provider.  · Take vitamin supplements only as told by your health care provider. These may include calcium, vitamin E, and vitamin D.  · Use vaginal lubricants or moisturizers to help with vaginal dryness and improve comfort during sex.  · Talk with your health care provider before starting any herbal supplements.  · Keep all follow-up visits as told by your health care provider. This is important. This includes any group therapy or counseling.  Contact a health care provider if:  · You have heavy vaginal bleeding or pass blood clots.  · Your period lasts more than 2 days longer than normal.  · Your periods are recurring sooner than 21 days.  · You bleed after having sex.  Get help right away if:  · You have chest pain, trouble breathing, or trouble talking.  · You have severe depression.  · You have pain when you urinate.  · You have severe headaches.  · You have vision problems.  Summary  · Perimenopause is the time when a woman's body begins to move into menopause. This may happen naturally or as a result of other health problems or medical treatments.  · Perimenopause can begin 2-8 years before menopause, and it usually lasts for 1 year after menopause.  · Perimenopausal symptoms can be managed through medicines, lifestyle changes, and complementary therapies such as acupuncture.  This information is not intended to replace advice given to you by your health care provider. Make sure you discuss any questions you have with your health care provider.  Document Released: 01/25/2006 Document Revised: 01/23/2018 Document Reviewed: 01/23/2018  [employer] Interactive Patient Education © 2019 [employer]    Eating Plan for Osteoporosis  Osteoporosis causes your bones to become weak and brittle. This puts you at greater risk for bone breaks  (fractures) from small bumps or falls. Making changes to your diet and increasing your physical activity can help strengthen your bones and improve your overall health.  Calcium and vitamin D are nutrients that play an important role in bone health. Vitamin D helps your body use calcium and strengthen bones. Therefore, it is important to get enough calcium and vitamin D as part of your eating plan for osteoporosis.  What are tips for following this plan?  Reading food labels  · Try to get at least 1,000 milligrams (mg) of calcium each day.  · Look for foods that have at least 50 mg of calcium per serving.  · Talk with your health care provider about taking a calcium supplement if you do not get enough calcium from food.  · Do not have more than 2,500 mg of calcium each day. This is the upper limit for food and nutritional supplements combined. Too much calcium may cause constipation and prevent you from absorbing other important nutrients.  · Choose foods that contain vitamin D.  · Take a daily vitamin supplement that contains 800-1,000 international units (IU) of vitamin D. The amount may be different depending on your age, body weight, ethnicity, and where you live. Talk with your dietitian or health care provider about how much vitamin D is right for you.  · Avoid foods that have more than 300 mg of sodium per serving. Too much sodium can cause your body to lose calcium.  · Talk with your dietitian or health care provider about how much sodium you are allowed each day.  Shopping  · Do not buy foods with added salt, including:  ? Salted snacks.  ? Pickles.  ? Canned soups.  ? Canned meats.  ? Processed meats, such as fernández or cold cuts.  ? Smoked fish.  Meal planning  · Eat balanced meals that contain protein foods, fruits and vegetables, and foods rich in calcium and vitamin D.  · Eat at least 5 servings of fruits and vegetables each day.  · Eat 5-6 oz. of lean meat, poultry, fish, eggs, or beans each  day.  Lifestyle  · Do not use any products that contain nicotine or tobacco, such as cigarettes and e-cigarettes. If you need help quitting, ask your health care provider.  · If your health care provider recommends that you lose weight:  ? Work with a dietitian to develop an eating plan that will help you reach your desired weight goal.  ? Exercise for at least 30 minutes a day, 5 or more days a week, or as told by your health care provider.  · Work with a physical therapist to develop an exercise plan that includes flexibility, balance, and strength exercises.  · If you drink alcohol, limit how much you have. This means:  ? 0-1 drink a day for women.  ? 0-2 drinks a day for men.  ? Be aware of how much alcohol is in your drink. In the [place] one drink equals one typical bottle of beer (12 oz), one-half glass of wine (5 oz), or one shot of hard liquor (1½ oz).  What foods should I eat?  Foods high in calcium    · Yogurt. Yogurt with fruit.  · Milk. Evaporated skim milk. Dry milk powder.  · Calcium-fortified orange juice.  · Parmesan cheese. Part-skim ricotta cheese. Natural hard cheese. Cream cheese. Cottage cheese.  · Canned sardines. Canned salmon.  · Calcium-treated tofu. Calcium-fortified cereal bar. Calcium-fortified cereal. Calcium-fortified odilon crackers.  · Cooked cherry greens. Turnip greens. Broccoli. Kale.  · Almonds.  · White beans.  · Corn tortilla.  Foods high in vitamin D  · Cod liver oil. Fatty fish, such as tuna, mackerel, and salmon.  · Milk. Fortified soy milk. Fortified fruit juice.  · Yogurt. Margarine.  · Egg yolks.  Foods high in protein  · Beef. Lamb. Pork tenderloin.  · Chicken breast.  · Tuna (canned). Fish fillet.  · Tofu.   · Soy beans (cooked). Soy stephania. Beans (canned or cooked).  · Cottage cheese.  · Yogurt.  · Peanut butter.  · Pumpkin seeds. Nuts. Sunflower seeds.  · Hard cheese.  · Milk or other milk products, such as soy milk.  The items listed above may not be a complete list of  foods and beverages you can eat. Contact a dietitian for more options.  Summary  · Calcium and vitamin D are nutrients that play an important role in bone health and are an important part of your eating plan for osteoporosis.  · Eat balanced meals that contain protein foods, fruits and vegetables, and foods rich in calcium and vitamin D.  · Avoid foods that have more than 300 mg of sodium per serving. Too much sodium can cause your body to lose calcium.  · Exercise is an important part of prevention and treatment of osteoporosis. Aim for at least 30 minutes a day, 5 days a week.  This information is not intended to replace advice given to you by your health care provider. Make sure you discuss any questions you have with your health care provider.  Document Released: 02/25/2019 Document Revised: 02/25/2019 Document Reviewed: 02/25/2019  [employer] Interactive Patient Education © 2019 [employer]    Bone Health  Bones protect organs, store calcium, anchor muscles, and support the whole body. Keeping your bones strong is important, especially as you get older. You can take actions to help keep your bones strong and healthy.  Why is keeping my bones healthy important?    Keeping your bones healthy is important because your body constantly replaces bone cells. Cells get old, and new cells take their place. As we age, we lose bone cells because the body may not be able to make enough new cells to replace the old cells. The amount of bone cells and bone tissue you have is referred to as bone mass. The higher your bone mass, the stronger your bones.  The aging process leads to an overall loss of bone mass in the body, which can increase the likelihood of:  · Joint pain and stiffness.  · Broken bones.  · A condition in which the bones become weak and brittle (osteoporosis).  A large decline in bone mass occurs in older adults. In women, it occurs about the time of menopause.  What actions can I take to keep my bones  healthy?  Good health habits are important for maintaining healthy bones. This includes eating nutritious foods and exercising regularly. To have healthy bones, you need to get enough of the right minerals and vitamins. Most nutrition experts recommend getting these nutrients from the foods that you eat. In some cases, taking supplements may also be recommended. Doing certain types of exercise is also important for bone health.  What are the nutritional recommendations for healthy bones?    Eating a well-balanced diet with plenty of calcium and vitamin D will help to protect your bones. Nutritional recommendations vary from person to person. Ask your health care provider what is healthy for you. Here are some general guidelines.  Get enough calcium  Calcium is the most important (essential) mineral for bone health. Most people can get enough calcium from their diet, but supplements may be recommended for people who are at risk for osteoporosis. Good sources of calcium include:  · Dairy products, such as low-fat or nonfat milk, cheese, and yogurt.  · Dark green leafy vegetables, such as bok sammy and broccoli.  · Calcium-fortified foods, such as orange juice, cereal, bread, soy beverages, and tofu products.  · Nuts, such as almonds.  Follow these recommended amounts for daily calcium intake:  · Children, age 1-3: 700 mg.  · Children, age 4-8: 1,000 mg.  · Children, age 9-13: 1,300 mg.  · Teens, age 14-18: 1,300 mg.  · Adults, age 19-50: 1,000 mg.  · Adults, age 51-70:  ? Men: 1,000 mg.  ? Women: 1,200 mg.  · Adults, age 71 or older: 1,200 mg.  · Pregnant and breastfeeding females:  ? Teens: 1,300 mg.  ? Adults: 1,000 mg.  Get enough vitamin D  Vitamin D is the most essential vitamin for bone health. It helps the body absorb calcium. Sunlight stimulates the skin to make vitamin D, so be sure to get enough sunlight. If you live in a cold climate or you do not get outside often, your health care provider may recommend  that you take vitamin D supplements. Good sources of vitamin D in your diet include:  · Egg yolks.  · Saltwater fish.  · Milk and cereal fortified with vitamin D.  Follow these recommended amounts for daily vitamin D intake:  · Children and teens, age 1-18: 600 international units.  · Adults, age 50 or younger: 400-800 international units.  · Adults, age 51 or older: 800-1,000 international units.  Get other important nutrients  Other nutrients that are important for bone health include:  · Phosphorus. This mineral is found in meat, poultry, dairy foods, nuts, and legumes. The recommended daily intake for adult men and adult women is 700 mg.  · Magnesium. This mineral is found in seeds, nuts, dark green vegetables, and legumes. The recommended daily intake for adult men is 400-420 mg. For adult women, it is 310-320 mg.  · Vitamin K. This vitamin is found in green leafy vegetables. The recommended daily intake is 120 mg for adult men and 90 mg for adult women.  What type of physical activity is best for building and maintaining healthy bones?  Weight-bearing and strength-building activities are important for building and maintaining healthy bones. Weight-bearing activities cause muscles and bones to work against gravity. Strength-building activities increase the strength of the muscles that support bones. Weight-bearing and muscle-building activities include:  · Walking and hiking.  · Jogging and running.  · Dancing.  · Gym exercises.  · Lifting weights.  · Tennis and racquetball.  · Climbing stairs.  · Aerobics.  Adults should get at least 30 minutes of moderate physical activity on most days. Children should get at least 60 minutes of moderate physical activity on most days. Ask your health care provider what type of exercise is best for you.  How can I find out if my bone mass is low?  Bone mass can be measured with an X-ray test called a bone mineral density (BMD) test. This test is recommended for all women who  are age 65 or older. It may also be recommended for:  · Men who are age 70 or older.  · People who are at risk for osteoporosis because of:  ? Having bones that break easily.  ? Having a long-term disease that weakens bones, such as kidney disease or rheumatoid arthritis.  ? Having menopause earlier than normal.  ? Taking medicine that weakens bones, such as steroids, thyroid hormones, or hormone treatment for breast cancer or prostate cancer.  ? Smoking.  ? Drinking three or more alcoholic drinks a day.  If you find that you have a low bone mass, you may be able to prevent osteoporosis or further bone loss by changing your diet and lifestyle.  Where can I find more information?  For more information, check out the following websites:  · National Osteoporosis Foundation: www.nof.org/patients  · National Institutes of Health: www.bones.nih.gov  · International Osteoporosis Foundation: www.iofbonehealth.org  Summary  · The aging process leads to an overall loss of bone mass in the body, which can increase the likelihood of broken bones and osteoporosis.  · Eating a well-balanced diet with plenty of calcium and vitamin D will help to protect your bones.  · Weight-bearing and strength-building activities are also important for building and maintaining strong bones.  · Bone mass can be measured with an X-ray test called a bone mineral density (BMD) test.  This information is not intended to replace advice given to you by your health care provider. Make sure you discuss any questions you have with your health care provider.  Document Released: 03/09/2005 Document Revised: 01/14/2019 Document Reviewed: 01/14/2019  [employer] Interactive Patient Education © 2019 [employer]  Health Maintenance, Female  Adopting a healthy lifestyle and getting preventive care can go a long way to promote health and wellness. Talk with your health care provider about what schedule of regular examinations is right for you. This is a good  chance for you to check in with your provider about disease prevention and staying healthy.  In between checkups, there are plenty of things you can do on your own. Experts have done a lot of research about which lifestyle changes and preventive measures are most likely to keep you healthy. Ask your health care provider for more information.  Weight and diet  Eat a healthy diet  · Be sure to include plenty of vegetables, fruits, low-fat dairy products, and lean protein.  · Do not eat a lot of foods high in solid fats, added sugars, or salt.  · Get regular exercise. This is one of the most important things you can do for your health.  ? Most adults should exercise for at least 150 minutes each week. The exercise should increase your heart rate and make you sweat (moderate-intensity exercise).  ? Most adults should also do strengthening exercises at least twice a week. This is in addition to the moderate-intensity exercise.  Maintain a healthy weight  · Body mass index (BMI) is a measurement that can be used to identify possible weight problems. It estimates body fat based on height and weight. Your health care provider can help determine your BMI and help you achieve or maintain a healthy weight.  · For females 20 years of age and older:  ? A BMI below 18.5 is considered underweight.  ? A BMI of 18.5 to 24.9 is normal.  ? A BMI of 25 to 29.9 is considered overweight.  ? A BMI of 30 and above is considered obese.  Watch levels of cholesterol and blood lipids  · You should start having your blood tested for lipids and cholesterol at 20 years of age, then have this test every 5 years.  · You may need to have your cholesterol levels checked more often if:  ? Your lipid or cholesterol levels are high.  ? You are older than 50 years of age.  ? You are at high risk for heart disease.  Cancer screening  Lung Cancer  · Lung cancer screening is recommended for adults 55-80 years old who are at high risk for lung cancer because  of a history of smoking.  · A yearly low-dose CT scan of the lungs is recommended for people who:  ? Currently smoke.  ? Have quit within the past 15 years.  ? Have at least a 30-pack-year history of smoking. A pack year is smoking an average of one pack of cigarettes a day for 1 year.  · Yearly screening should continue until it has been 15 years since you quit.  · Yearly screening should stop if you develop a health problem that would prevent you from having lung cancer treatment.  Breast Cancer  · Practice breast self-awareness. This means understanding how your breasts normally appear and feel.  · It also means doing regular breast self-exams. Let your health care provider know about any changes, no matter how small.  · If you are in your 20s or 30s, you should have a clinical breast exam (CBE) by a health care provider every 1-3 years as part of a regular health exam.  · If you are 40 or older, have a CBE every year. Also consider having a breast X-ray (mammogram) every year.  · If you have a family history of breast cancer, talk to your health care provider about genetic screening.  · If you are at high risk for breast cancer, talk to your health care provider about having an MRI and a mammogram every year.  · Breast cancer gene (BRCA) assessment is recommended for women who have family members with BRCA-related cancers. BRCA-related cancers include:  ? Breast.  ? Ovarian.  ? Tubal.  ? Peritoneal cancers.  · Results of the assessment will determine the need for genetic counseling and BRCA1 and BRCA2 testing.  Cervical Cancer  Your health care provider may recommend that you be screened regularly for cancer of the pelvic organs (ovaries, uterus, and vagina). This screening involves a pelvic examination, including checking for microscopic changes to the surface of your cervix (Pap test). You may be encouraged to have this screening done every 3 years, beginning at age 21.  · For women ages 30-65, health care  providers may recommend pelvic exams and Pap testing every 3 years, or they may recommend the Pap and pelvic exam, combined with testing for human papilloma virus (HPV), every 5 years. Some types of HPV increase your risk of cervical cancer. Testing for HPV may also be done on women of any age with unclear Pap test results.  · Other health care providers may not recommend any screening for nonpregnant women who are considered low risk for pelvic cancer and who do not have symptoms. Ask your health care provider if a screening pelvic exam is right for you.  · If you have had past treatment for cervical cancer or a condition that could lead to cancer, you need Pap tests and screening for cancer for at least 20 years after your treatment. If Pap tests have been discontinued, your risk factors (such as having a new sexual partner) need to be reassessed to determine if screening should resume. Some women have medical problems that increase the chance of getting cervical cancer. In these cases, your health care provider may recommend more frequent screening and Pap tests.  Colorectal Cancer  · This type of cancer can be detected and often prevented.  · Routine colorectal cancer screening usually begins at 50 years of age and continues through 75 years of age.  · Your health care provider may recommend screening at an earlier age if you have risk factors for colon cancer.  · Your health care provider may also recommend using home test kits to check for hidden blood in the stool.  · A small camera at the end of a tube can be used to examine your colon directly (sigmoidoscopy or colonoscopy). This is done to check for the earliest forms of colorectal cancer.  · Routine screening usually begins at age 50.  · Direct examination of the colon should be repeated every 5-10 years through 75 years of age. However, you may need to be screened more often if early forms of precancerous polyps or small growths are found.  Skin  Cancer  · Check your skin from head to toe regularly.  · Tell your health care provider about any new moles or changes in moles, especially if there is a change in a mole's shape or color.  · Also tell your health care provider if you have a mole that is larger than the size of a pencil eraser.  · Always use sunscreen. Apply sunscreen liberally and repeatedly throughout the day.  · Protect yourself by wearing long sleeves, pants, a wide-brimmed hat, and sunglasses whenever you are outside.  Heart disease, diabetes, and high blood pressure  · High blood pressure causes heart disease and increases the risk of stroke. High blood pressure is more likely to develop in:  ? People who have blood pressure in the high end of the normal range (130-139/85-89 mm Hg).  ? People who are overweight or obese.  ? People who are .  · If you are 18-39 years of age, have your blood pressure checked every 3-5 years. If you are 40 years of age or older, have your blood pressure checked every year. You should have your blood pressure measured twice--once when you are at a hospital or clinic, and once when you are not at a hospital or clinic. Record the average of the two measurements. To check your blood pressure when you are not at a hospital or clinic, you can use:  ? An automated blood pressure machine at a pharmacy.  ? A home blood pressure monitor.  · If you are between 55 years and 79 years old, ask your health care provider if you should take aspirin to prevent strokes.  · Have regular diabetes screenings. This involves taking a blood sample to check your fasting blood sugar level.  ? If you are at a normal weight and have a low risk for diabetes, have this test once every three years after 45 years of age.  ? If you are overweight and have a high risk for diabetes, consider being tested at a younger age or more often.  Preventing infection  Hepatitis B  · If you have a higher risk for hepatitis B, you should be  screened for this virus. You are considered at high risk for hepatitis B if:  ? You were born in a country where hepatitis B is common. Ask your health care provider which countries are considered high risk.  ? Your parents were born in a high-risk country, and you have not been immunized against hepatitis B (hepatitis B vaccine).  ? You have HIV or AIDS.  ? You use needles to inject street drugs.  ? You live with someone who has hepatitis B.  ? You have had sex with someone who has hepatitis B.  ? You get hemodialysis treatment.  ? You take certain medicines for conditions, including cancer, organ transplantation, and autoimmune conditions.  Hepatitis C  · Blood testing is recommended for:  ? Everyone born from 1945 through 1965.  ? Anyone with known risk factors for hepatitis C.  Sexually transmitted infections (STIs)  · You should be screened for sexually transmitted infections (STIs) including gonorrhea and chlamydia if:  ? You are sexually active and are younger than 24 years of age.  ? You are older than 24 years of age and your health care provider tells you that you are at risk for this type of infection.  ? Your sexual activity has changed since you were last screened and you are at an increased risk for chlamydia or gonorrhea. Ask your health care provider if you are at risk.  · If you do not have HIV, but are at risk, it may be recommended that you take a prescription medicine daily to prevent HIV infection. This is called pre-exposure prophylaxis (PrEP). You are considered at risk if:  ? You are sexually active and do not regularly use condoms or know the HIV status of your partner(s).  ? You take drugs by injection.  ? You are sexually active with a partner who has HIV.  Talk with your health care provider about whether you are at high risk of being infected with HIV. If you choose to begin PrEP, you should first be tested for HIV. You should then be tested every 3 months for as long as you are taking  PrEP.  Pregnancy  · If you are premenopausal and you may become pregnant, ask your health care provider about preconception counseling.  · If you may become pregnant, take 400 to 800 micrograms (mcg) of folic acid every day.  · If you want to prevent pregnancy, talk to your health care provider about birth control (contraception).  Osteoporosis and menopause  · Osteoporosis is a disease in which the bones lose minerals and strength with aging. This can result in serious bone fractures. Your risk for osteoporosis can be identified using a bone density scan.  · If you are 65 years of age or older, or if you are at risk for osteoporosis and fractures, ask your health care provider if you should be screened.  · Ask your health care provider whether you should take a calcium or vitamin D supplement to lower your risk for osteoporosis.  · Menopause may have certain physical symptoms and risks.  · Hormone replacement therapy may reduce some of these symptoms and risks.  Talk to your health care provider about whether hormone replacement therapy is right for you.  Follow these instructions at home:  · Schedule regular health, dental, and eye exams.  · Stay current with your immunizations.  · Do not use any tobacco products including cigarettes, chewing tobacco, or electronic cigarettes.  · If you are pregnant, do not drink alcohol.  · If you are breastfeeding, limit how much and how often you drink alcohol.  · Limit alcohol intake to no more than 1 drink per day for nonpregnant women. One drink equals 12 ounces of beer, 5 ounces of wine, or 1½ ounces of hard liquor.  · Do not use street drugs.  · Do not share needles.  · Ask your health care provider for help if you need support or information about quitting drugs.  · Tell your health care provider if you often feel depressed.  · Tell your health care provider if you have ever been abused or do not feel safe at home.  This information is not intended to replace advice given  to you by your health care provider. Make sure you discuss any questions you have with your health care provider.  Document Released: 07/02/2012 Document Revised: 05/25/2017 Document Reviewed: 09/20/2016  Elsevier Interactive Patient Education © 2019 [employer]

## 2019-09-26 NOTE — PROGRESS NOTES
Chief Complaint   Patient presents with   • Annual Exam         Patient Care Team:  Hollie Chapa APRN as PCP - General (Nurse Practitioner)  Levon Robertson MD as Obstetrician (Obstetrics and Gynecology)  Alejandro Hernandez MD as Consulting Physician (Orthopedic Surgery)     Chief complaint: Patient is in today for a physical     Patient is a 49 y.o. female who presents for her yearly physical exam.     HPI   Patient is here for annual physical, she had a right hip fracture in 2018 with nailing, had poor healing, had arthroplasty in march of 2019. Is on Forteo for osteoporosis.   States she is having anxiety related to her boyfriend's health problems, she feels he is not taking care of himself and is thinking about asking him to leave. They are currently living were her mother. States she ran out of wellbutrin for a few days and symptoms worsened.   Review of Systems   Constitutional: Positive for appetite change (decreased with anxiety). Negative for activity change, chills, diaphoresis, fatigue, fever and unexpected weight change.   HENT: Negative for congestion, ear pain and trouble swallowing.    Eyes: Positive for visual disturbance (occ blurry without glasses). Negative for photophobia, pain, discharge, redness and itching.   Respiratory: Positive for shortness of breath (with anxiety). Negative for cough and wheezing.    Cardiovascular: Positive for leg swelling (occ). Negative for chest pain and palpitations.   Gastrointestinal: Positive for constipation (occ), diarrhea (occ) and nausea (assoc with anxiety). Negative for abdominal distention, abdominal pain, blood in stool and vomiting.   Endocrine: Negative for polydipsia and polyuria.   Genitourinary: Negative for difficulty urinating, dysuria and vaginal bleeding.   Musculoskeletal: Positive for back pain (occ). Negative for arthralgias, gait problem and joint swelling.   Skin: Negative for color change, pallor, rash and wound.    Allergic/Immunologic: Positive for environmental allergies.   Neurological: Positive for numbness (in fingers). Negative for dizziness, light-headedness and headaches.   Psychiatric/Behavioral: Positive for sleep disturbance. Negative for decreased concentration. The patient is nervous/anxious.       History  Past Medical History:   Diagnosis Date   • Anxiety    • Depression    • H/O blood clots    • History of low back pain    • HPV (human papilloma virus) infection    • Muscle weakness    • Skin cancer    • Uterine fibroid       Past Surgical History:   Procedure Laterality Date   • DIAGNOSTIC LAPAROSCOPY     • ENDOMETRIAL ABLATION  2006   • HIP TROCHANTERIC NAILING WITH INTRAMEDULLARY HIP SCREW Right 7/4/2018    Procedure: HIP TROCANTERIC NAILING WITH INTRAMEDULLARY HIP SCREW;  Surgeon: Alejandro Hernandez MD;  Location: [facility];  Service: Orthopedics   • MULTIPLE TOOTH EXTRACTIONS     • TOTAL HIP ARTHROPLASTY Right     03/21/2019   • TUBAL ABDOMINAL LIGATION        Allergies   Allergen Reactions   • Betadine [Povidone Iodine] Swelling     Used in nostrils before surgery, OK on skin      Family History   Problem Relation Age of Onset   • Osteoporosis Other    • Arthritis Mother    • Asthma Mother    • Cancer Mother    • COPD Mother    • Hyperlipidemia Mother    • Hypertension Mother    • Kidney disease Mother    • Miscarriages / Stillbirths Mother    • Osteoporosis Mother    • Cancer Father 74        bladder   • Hearing loss Father    • Arthritis Maternal Aunt    • Osteoporosis Maternal Aunt    • Cancer Paternal Aunt    • Osteoporosis Paternal Aunt    • Alcohol abuse Maternal Grandmother    • Arthritis Maternal Grandmother    • Diabetes Maternal Grandmother    • Hyperlipidemia Maternal Grandmother    • Hypertension Maternal Grandmother    • Osteoporosis Maternal Grandmother    • COPD Paternal Grandmother      Social History     Socioeconomic History   • Marital status: Single     Spouse name: Not on file   •  Number of children: Not on file   • Years of education: Not on file   • Highest education level: Not on file   Tobacco Use   • Smoking status: Current Some Day Smoker     Packs/day: 0.50     Years: 30.00     Pack years: 15.00     Types: Cigarettes   • Smokeless tobacco: Never Used   Substance and Sexual Activity   • Alcohol use: Yes     Frequency: Monthly or less     Drinks per session: 1 or 2     Binge frequency: Never     Comment: Occasional   • Drug use: Yes     Types: Marijuana     Comment: occ   • Sexual activity: Defer        Current Outpatient Medications:   •  buPROPion XL (WELLBUTRIN XL) 150 MG 24 hr tablet, Take 1 tablet by mouth Daily., Disp: 30 tablet, Rfl: 11  •  Calcium Carbonate-Vitamin D (OSCAL-500) 500-400 MG-UNIT per tablet, Take 2 tablets by mouth Daily., Disp: , Rfl:   •  Insulin Pen Needle 32G X 4 MM misc, Use 1 needle Daily., Disp: 100 each, Rfl: 3  •  polyethylene glycol (MIRALAX) powder, Take 17 g by mouth Daily As Needed (constipation)., Disp: 225 g, Rfl: 1  •  STOOL SOFTENER 250 MG capsule, , Disp: , Rfl:   •  teriparatide (FORTEO) 600 MCG/2.4ML injection, Inject 0.08 mL under the skin into the appropriate area as directed Daily., Disp: 2.4 mL, Rfl: 11    Immunizations   N/A   Prescribed/Refused   Date     Td/Tdap  (Booster Q 10 yrs)   []           Prescribed    []     Refused        []           Flu  (Yearly)   []        Prescribed    [x]     Refused        []           Pneumovax  (1 yr after Prevnar)   []        Prescribed    []     Refused        []           Prevnar 13  (1 yr after Pneumo)   []        Prescribed    []     Refused        []           Hep B     []        Prescribed    []     Refused        []           Shingles  (Age 50 and older)   []        Prescribed    []     Refused        []           Immunization History   Administered Date(s) Administered   • FLUARIX/FLUZONE/AFLURIA/FLULAVAL QUAD 09/26/2019     Health Maintenance   Topic Date Due   • ANNUAL PHYSICAL  04/19/1973    • PNEUMOCOCCAL VACCINE (19-64 MEDIUM RISK) (1 of 1 - PPSV23) 1989   • TDAP/TD VACCINES (1 - Tdap) 1989   • PAP SMEAR  2018   • MAMMOGRAM  2019   • DXA SCAN  2020   • INFLUENZA VACCINE  Completed        Diabetes  [] Yes  [x] No   N/A      Date     Eye Exam     []             []   Complete     []   Recommended Date:  Where:       Foot Exam     []         []   Complete          Obesity Counseling     []       []   Complete     No results found for: HGBA1C, MICROALBUR    Additional Testing      Date     Colorectal Screening       []   N/A   []   Complete    []   Ordered     Date:    Where:       Pap      []   N/A   [x]   Complete   []   OB/GYN Date:    Where:       Mammogram        []   N/A   []   Complete   [x]   Ordered Date:    Where:     PSA  (Over age 50)    []   N/A   []   Complete   []   Ordered Date:    Where:     US Aorta  (For male smokers, age 65)     []   N/A   []   Complete   []   Ordered Date:    Where:     CT for Smoker  (Age 55-75, 30 pk yr)    []   N/A   []   Complete   []   Ordered Date:    Where:     Bone Density/DEXA      []   N/A   [x]   Complete   []   Ordered Date:    Follow-up:     Hep. C  ( 8085-5584)      []   N/A   []   Complete   []   Ordered Date:    Where:     Results for orders placed or performed in visit on 19   CBC Auto Differential   Result Value Ref Range    WBC 10.51 3.40 - 10.80 10*3/mm3    RBC 4.73 3.77 - 5.28 10*6/mm3    Hemoglobin 15.1 12.0 - 15.9 g/dL    Hematocrit 44.4 34.0 - 46.6 %    MCV 93.9 79.0 - 97.0 fL    MCH 31.9 26.6 - 33.0 pg    MCHC 34.0 31.5 - 35.7 g/dL    RDW 12.8 12.3 - 15.4 %    RDW-SD 43.7 37.0 - 54.0 fl    MPV 11.1 6.0 - 12.0 fL    Platelets 271 140 - 450 10*3/mm3    Neutrophil % 64.1 42.7 - 76.0 %    Lymphocyte % 27.8 19.6 - 45.3 %    Monocyte % 6.2 5.0 - 12.0 %    Eosinophil % 0.9 0.3 - 6.2 %    Basophil % 0.6 0.0 - 1.5 %    Immature Grans % 0.4 0.0 - 0.5 %    Neutrophils, Absolute 6.75 1.70 - 7.00 10*3/mm3     "Lymphocytes, Absolute 2.92 0.70 - 3.10 10*3/mm3    Monocytes, Absolute 0.65 0.10 - 0.90 10*3/mm3    Eosinophils, Absolute 0.09 0.00 - 0.40 10*3/mm3    Basophils, Absolute 0.06 0.00 - 0.20 10*3/mm3    Immature Grans, Absolute 0.04 0.00 - 0.05 10*3/mm3    nRBC 0.0 0.0 - 0.2 /100 WBC   Comprehensive Metabolic Panel   Result Value Ref Range    Glucose 88 65 - 99 mg/dL    BUN 9 6 - 20 mg/dL    Creatinine 1.07 (H) 0.57 - 1.00 mg/dL    Sodium 141 136 - 145 mmol/L    Potassium 4.9 3.5 - 5.2 mmol/L    Chloride 101 98 - 107 mmol/L    CO2 26.2 22.0 - 29.0 mmol/L    Calcium 9.4 8.6 - 10.5 mg/dL    Total Protein 7.4 6.0 - 8.5 g/dL    Albumin 5.10 3.50 - 5.20 g/dL    ALT (SGPT) 11 1 - 33 U/L    AST (SGOT) 18 1 - 32 U/L    Alkaline Phosphatase 88 39 - 117 U/L    Total Bilirubin 0.2 0.2 - 1.2 mg/dL    eGFR Non African Amer 55 (L) >60 mL/min/1.73    Globulin 2.3 gm/dL    A/G Ratio 2.2 g/dL    BUN/Creatinine Ratio 8.4 7.0 - 25.0    Anion Gap 13.8 5.0 - 15.0 mmol/L   Lipid Panel   Result Value Ref Range    Total Cholesterol 289 (H) 0 - 200 mg/dL    Triglycerides 117 0 - 150 mg/dL    HDL Cholesterol 70 (H) 40 - 60 mg/dL    LDL Cholesterol  196 (H) 0 - 100 mg/dL    VLDL Cholesterol 23.4 5 - 40 mg/dL    LDL/HDL Ratio 2.79    TSH   Result Value Ref Range    TSH 1.250 0.270 - 4.200 uIU/mL   Vitamin D 25 Hydroxy   Result Value Ref Range    25 Hydroxy, Vitamin D 77.1 30.0 - 100.0 ng/ml            /84 (BP Location: Left arm, Patient Position: Sitting, Cuff Size: Adult)   Pulse 80   Ht 157.5 cm (62.01\")   Wt 58.2 kg (128 lb 6.4 oz)   SpO2 99%   BMI 23.48 kg/m²       Physical Exam   Constitutional: She is oriented to person, place, and time. She appears well-developed and well-nourished. No distress.   HENT:   Head: Normocephalic and atraumatic.   Right Ear: External ear normal.   Left Ear: External ear normal.   Mouth/Throat: Oropharynx is clear and moist. No oropharyngeal exudate.   Eyes: Conjunctivae and EOM are normal. Right eye " exhibits no discharge. Left eye exhibits no discharge. No scleral icterus.   Neck: Normal range of motion. Neck supple. No JVD (no bruits) present. No tracheal deviation present. No thyromegaly present.   Cardiovascular: Normal rate, regular rhythm, normal heart sounds and intact distal pulses. Exam reveals no friction rub.   No murmur heard.  Pulmonary/Chest: Effort normal and breath sounds normal. No respiratory distress. She has no wheezes. She has no rales. She exhibits no tenderness. Right breast exhibits no inverted nipple, no mass, no nipple discharge, no skin change and no tenderness. Left breast exhibits no inverted nipple, no mass, no nipple discharge, no skin change and no tenderness. Breasts are symmetrical.   Abdominal: Soft. Normal appearance and bowel sounds are normal. She exhibits no distension and no mass. There is no hepatosplenomegaly. There is no tenderness. No hernia.   Genitourinary: Cervix exhibits friability. Cervix exhibits no motion tenderness and no discharge.   Genitourinary Comments: External genitalia without erythema, masses, exudate or discharge. Vaginal vault is without discharge. Cervix is of normal color without lesion noted.There is no bleeding noted. Uterus is noted to be of normal size and nontender. No cervical motion tenderness is seen. No masses are palpated. The adnexa are without masses or tenderness.   Musculoskeletal: She exhibits no edema, tenderness or deformity.   Lymphadenopathy:     She has no cervical adenopathy.   Neurological: She is alert and oriented to person, place, and time. She has normal reflexes. She displays normal reflexes.   Skin: Skin is warm and dry. No rash noted. She is not diaphoretic. No erythema. No pallor.   Psychiatric: She has a normal mood and affect. Her behavior is normal. Thought content normal.   Nursing note and vitals reviewed.          Counseling provided on diet and nutrition, exercise, prevention of cardiac or vascular disease,  tobacco cessation and flu prevention.     Diagnoses and all orders for this visit:    Annual physical exam    Breast cancer screening  -     Mammo Screening Digital Tomosynthesis Bilateral With CAD; Future    Anxiety    Cervical cancer screening  -     Liquid-based Pap Smear, Screening; Future    Healthcare maintenance  -     CBC Auto Differential  -     Comprehensive Metabolic Panel  -     Lipid Panel  -     TSH    Vitamin D deficiency  -     Vitamin D 25 Hydroxy    Needs flu shot  -     Fluarix/Fluzone/Afluria/FluLaval ([id number])    Osteoporosis, unspecified osteoporosis type, unspecified pathological fracture presence       Screening labs ordered to evaluate chronic conditions. I will contact patient regarding test results and provide instructions regarding any necessary changes in plan of care.  Encouraged patient to take time for herself, medications will not resolve the issues causing her anxiety. Needs to determine her best option. Follow up with any worsening symptoms, continue wellbutrin, consider an SSRI, SNRI or adding hydroxyzine for anxiety  Continue Forteo, DEXA scan repeat is due next year.   Smoking cessation advised.  Pt voiced understanding of health risks of continued smoking.  Patient was encouraged to keep me informed of any acute changes, lack of improvement, or any new concerning symptoms.  Nutrition and activity goals reviewed including: mainly water to drink, limit white flour/processed sugar, and processed foods, choose fresh fruits, vegetables, fish, lean meats,high fiber carbs, exercise  working toward 150 mins cardio per week, weight training 2x/week.  Printed healthcare maint recommendations provided today, including healthy heart diet, exercise, breast and cervical cancer screening, smoking cessation, and immunizations.         GIANNA Echeverria   9/27/2019   8:34 AM

## 2019-09-27 LAB — 25(OH)D3 SERPL-MCNC: 77.1 NG/ML (ref 30–100)

## 2019-11-14 ENCOUNTER — TELEPHONE (OUTPATIENT)
Dept: PEDIATRICS | Facility: OTHER | Age: 49
End: 2019-11-14

## 2019-11-14 NOTE — TELEPHONE ENCOUNTER
Contacted patient and to let her know samples will take a couple of days. We are awaiting a fax from [place] to receive more forteo.     She would like to go across the street to have her mammo completed. I told her the order is ready whenever she is available.

## 2019-11-14 NOTE — TELEPHONE ENCOUNTER
Contacted patient and advised her that we do not have any samples available today. She stated that [facility] usually keeps samples. I advised her we would try to contact the drug reps to request more    She also requesting info regarding her mammo. I advised her we would follow up with her as needed

## 2019-11-14 NOTE — TELEPHONE ENCOUNTER
Pt called stating she needs more samples of the forteo shots. Pt stated that [facility] usually gives 2 at a time. Pt would like a call back to let her know when she can pick those up.

## 2019-11-18 ENCOUNTER — TELEPHONE (OUTPATIENT)
Dept: FAMILY MEDICINE CLINIC | Facility: CLINIC | Age: 49
End: 2019-11-18

## 2019-11-18 NOTE — TELEPHONE ENCOUNTER
PATIENT STATES THAT SHE WOULD LIKE TO GET A CALL BACK AT THIS NUMBER [phone] UNTIL FURTHER NOTICE DUE TO HER PHONE NUMBER THAT IS ON FILE BEING OUT RIGHT NOW.THE PATIENT ALSO STATES THAT SHE WILL  THE ORDER FOR HER MAMMOGRAM WHEN SHE COMES IN TO  HER SAMPLES FOR HER FORTEO SHOTS. AGAIN PT MAY BE REACHED [phone]

## 2019-12-03 ENCOUNTER — OFFICE VISIT (OUTPATIENT)
Dept: FAMILY MEDICINE CLINIC | Facility: CLINIC | Age: 49
End: 2019-12-03

## 2019-12-03 ENCOUNTER — TELEPHONE (OUTPATIENT)
Dept: FAMILY MEDICINE CLINIC | Facility: CLINIC | Age: 49
End: 2019-12-03

## 2019-12-03 VITALS
HEIGHT: 63 IN | HEART RATE: 82 BPM | WEIGHT: 132 LBS | DIASTOLIC BLOOD PRESSURE: 82 MMHG | BODY MASS INDEX: 23.39 KG/M2 | SYSTOLIC BLOOD PRESSURE: 124 MMHG | OXYGEN SATURATION: 99 %

## 2019-12-03 DIAGNOSIS — M54.9 MUSCULOSKELETAL BACK PAIN: ICD-10-CM

## 2019-12-03 DIAGNOSIS — R10.9 RIGHT FLANK PAIN: Primary | ICD-10-CM

## 2019-12-03 LAB
BILIRUB BLD-MCNC: NEGATIVE MG/DL
CLARITY, POC: CLEAR
COLOR UR: ABNORMAL
GLUCOSE UR STRIP-MCNC: NEGATIVE MG/DL
KETONES UR QL: ABNORMAL
LEUKOCYTE EST, POC: NEGATIVE
NITRITE UR-MCNC: NEGATIVE MG/ML
PH UR: 6 [PH] (ref 5–8)
PROT UR STRIP-MCNC: ABNORMAL MG/DL
RBC # UR STRIP: NEGATIVE /UL
SP GR UR: 1.03 (ref 1–1.03)
UROBILINOGEN UR QL: NORMAL

## 2019-12-03 PROCEDURE — 99213 OFFICE O/P EST LOW 20 MIN: CPT | Performed by: PHYSICIAN ASSISTANT

## 2019-12-03 RX ORDER — CYCLOBENZAPRINE HCL 5 MG
5 TABLET ORAL 3 TIMES DAILY PRN
Qty: 30 TABLET | Refills: 0 | Status: SHIPPED | OUTPATIENT
Start: 2019-12-03 | End: 2021-03-29

## 2019-12-03 NOTE — TELEPHONE ENCOUNTER
We do not currently have any samples. Receiving samples is a temporary solution.     Would she be interested in the patient assistance program?      [facility] [facility] Patient Assistance Program  For eligible patients who need financial assistance with FORTEO, the [employer], a nonprofit organization, may be able to help. Go to www.1-800-DOCTORS or call 1-530.921.7057 to learn more.

## 2019-12-03 NOTE — PROGRESS NOTES
Chief Complaint   Patient presents with   • Back Pain     x 10days       HPI      Anthony Chau is a 49 y.o. female who presents for Back Pain (x 10days).  Patient has long complicated history with right hip fracture and surgical repair.  She presents today with right flank pain that radiates into lower right quadrant.  Pain started about 10 days ago after she admittedly was walking more long distances.  She is now using cane to ambulate and has noticed some relief in back/abdominal pain.  She is otherwise asymptomatic.  No change in stools, urinary symptoms, nausea/vomiting or fever/chills.  She took ibuprofen, tramadol and gabapentin and is unsure how much relief she got from these medications.  Does not have any muscle relaxants.  She mainly wants to rule out a urinary infection today.      Past Medical History:   Diagnosis Date   • Anxiety    • Depression    • H/O blood clots    • History of low back pain    • HPV (human papilloma virus) infection    • Muscle weakness    • Skin cancer    • Uterine fibroid        Past Surgical History:   Procedure Laterality Date   • DIAGNOSTIC LAPAROSCOPY     • ENDOMETRIAL ABLATION  2006   • HIP TROCHANTERIC NAILING WITH INTRAMEDULLARY HIP SCREW Right 7/4/2018    Procedure: HIP TROCANTERIC NAILING WITH INTRAMEDULLARY HIP SCREW;  Surgeon: Alejandro Hernandez MD;  Location: [facility];  Service: Orthopedics   • MULTIPLE TOOTH EXTRACTIONS     • TOTAL HIP ARTHROPLASTY Right     03/21/2019   • TUBAL ABDOMINAL LIGATION         Family History   Problem Relation Age of Onset   • Osteoporosis Other    • Arthritis Mother    • Asthma Mother    • Cancer Mother    • COPD Mother    • Hyperlipidemia Mother    • Hypertension Mother    • Kidney disease Mother    • Miscarriages / Stillbirths Mother    • Osteoporosis Mother    • Cancer Father 74        bladder   • Hearing loss Father    • Arthritis Maternal Aunt    • Osteoporosis Maternal Aunt    • Cancer Paternal Aunt    • Osteoporosis Paternal  "Aunt    • Alcohol abuse Maternal Grandmother    • Arthritis Maternal Grandmother    • Diabetes Maternal Grandmother    • Hyperlipidemia Maternal Grandmother    • Hypertension Maternal Grandmother    • Osteoporosis Maternal Grandmother    • COPD Paternal Grandmother        Social History     Socioeconomic History   • Marital status: Single     Spouse name: Not on file   • Number of children: Not on file   • Years of education: Not on file   • Highest education level: Not on file   Tobacco Use   • Smoking status: Current Some Day Smoker     Packs/day: 0.50     Years: 30.00     Pack years: 15.00     Types: Cigarettes   • Smokeless tobacco: Never Used   Substance and Sexual Activity   • Alcohol use: Yes     Frequency: Monthly or less     Drinks per session: 1 or 2     Binge frequency: Never     Comment: Occasional   • Drug use: Yes     Types: Marijuana     Comment: occ   • Sexual activity: Defer       Allergies   Allergen Reactions   • Betadine [Povidone Iodine] Swelling     Used in nostrils before surgery, OK on skin       ROS    Review of Systems   Constitutional: Negative for chills, diaphoresis, fatigue and fever.   Respiratory: Negative for cough, shortness of breath and wheezing.    Cardiovascular: Negative for chest pain and leg swelling.   Gastrointestinal: Positive for abdominal pain. Negative for blood in stool, constipation, diarrhea, nausea, vomiting and indigestion.   Genitourinary: Positive for flank pain. Negative for dysuria, frequency, hematuria and urgency.   Musculoskeletal: Positive for arthralgias, back pain, gait problem and myalgias.   Skin: Negative for rash and skin lesions.   Neurological: Positive for weakness. Negative for dizziness, numbness and headache.       Vitals:    12/03/19 1046   BP: 124/82   Pulse: 82   SpO2: 99%   Weight: 59.9 kg (132 lb)   Height: 160 cm (63\")     Body mass index is 23.38 kg/m².    Current Outpatient Medications on File Prior to Visit   Medication Sig Dispense " Refill   • buPROPion XL (WELLBUTRIN XL) 150 MG 24 hr tablet Take 1 tablet by mouth Daily. 30 tablet 11   • Calcium Carbonate-Vitamin D (OSCAL-500) 500-400 MG-UNIT per tablet Take 2 tablets by mouth Daily.     • Insulin Pen Needle 32G X 4 MM misc Use 1 needle Daily. 100 each 3   • polyethylene glycol (MIRALAX) powder Take 17 g by mouth Daily As Needed (constipation). 225 g 1   • STOOL SOFTENER 250 MG capsule      • teriparatide (FORTEO) 600 MCG/2.4ML injection Inject 0.08 mL under the skin into the appropriate area as directed Daily. 2.4 mL 11     No current facility-administered medications on file prior to visit.        Results for orders placed or performed in visit on 12/03/19   POC Urinalysis Dipstick, Automated   Result Value Ref Range    Color Dark Yellow Yellow, Straw, Dark Yellow, Shanna    Clarity, UA Clear Clear    Specific Gravity  1.030 1.005 - 1.030    pH, Urine 6.0 5.0 - 8.0    Leukocytes Negative Negative    Nitrite, UA Negative Negative    Protein, POC Trace (A) Negative mg/dL    Glucose, UA Negative Negative, 1000 mg/dL (3+) mg/dL    Ketones, UA Trace (A) Negative    Urobilinogen, UA Normal Normal    Bilirubin Negative Negative    Blood, UA Negative Negative       PE    Physical Exam   Constitutional: She appears well-developed and well-nourished. She is active and cooperative. No distress.   HENT:   Head: Normocephalic and atraumatic.   Eyes: EOM are normal.   Neck: Normal range of motion.   Abdominal: Soft. Bowel sounds are normal. She exhibits no distension and no mass. There is tenderness in the right lower quadrant and suprapubic area. There is no rigidity, no rebound, no guarding and no CVA tenderness.   Musculoskeletal: Normal range of motion. She exhibits no edema.   Neurological: She is alert.   Gait instability secondary to pain.  Using cane to ambulate.   Skin: Skin is warm. She is not diaphoretic. No erythema.   Psychiatric: She has a normal mood and affect. Her speech is normal and  behavior is normal. Judgment and thought content normal. She is not actively hallucinating. She is attentive.   Vitals reviewed.       A/P    Anthony was seen today for back pain.    Diagnoses and all orders for this visit:    Right flank pain  -     POC Urinalysis Dipstick, Automated  Urinalysis does not show blood or infection.    Musculoskeletal back pain  -     cyclobenzaprine (FLEXERIL) 5 MG tablet; Take 1 tablet by mouth 3 (Three) Times a Day As Needed for Muscle Spasms.  Pain is along right paracentral lumbar spine with radiation into right lower quadrant.  This is the same side as her recent hip surgery.  Given overuse recently with walking long distances, recommend rest, time, ice/heat, and pain medications.  Will prescribe flexeril prn, discussed possible sedation with this.  Call if symptoms do not improve or worsen.  Continue to use cane until more stead on feet.       Plan of care reviewed with patient at the conclusion of today's visit. Education was provided regarding diagnosis, management and any prescribed or recommended OTC medications.  Patient verbalizes understanding of and agreement with management plan.    No Follow-up on file.     Ligia Vidales PA-C

## 2019-12-03 NOTE — TELEPHONE ENCOUNTER
Pt called back. She no longer needs Forteo samples. She found out that it was approved by her insurance.

## 2020-03-31 ENCOUNTER — TELEMEDICINE (OUTPATIENT)
Dept: FAMILY MEDICINE CLINIC | Facility: TELEHEALTH | Age: 50
End: 2020-03-31

## 2020-03-31 DIAGNOSIS — R05.9 COUGH: Primary | ICD-10-CM

## 2020-03-31 PROCEDURE — 99213 OFFICE O/P EST LOW 20 MIN: CPT | Performed by: NURSE PRACTITIONER

## 2020-03-31 NOTE — PATIENT INSTRUCTIONS
Please quarantine for 14 days. If symptoms worses, return for another virtual visit or see primary care provider. Recommend over the counter flonase and daily allergy medication for symptoms (generic zyrtec, claritin, allegra). Please follow up with your primary care in a few days through call or evisit. For any COVID-19 related questions, call the [facility] department hotline [phone].      Allergic Rhinitis, Adult  Allergic rhinitis is an allergic reaction that affects the mucous membrane inside the nose. It causes sneezing, a runny or stuffy nose, and the feeling of mucus going down the back of the throat (postnasal drip). Allergic rhinitis can be mild to severe.  There are two types of allergic rhinitis:  · Seasonal. This type is also called hay fever. It happens only during certain seasons.  · Perennial. This type can happen at any time of the year.  What are the causes?  This condition happens when the body's defense system (immune system) responds to certain harmless substances called allergens as though they were germs.   Seasonal allergic rhinitis is triggered by pollen, which can come from grasses, trees, and weeds. Perennial allergic rhinitis may be caused by:  · House dust mites.  · Pet dander.  · Mold spores.  What are the signs or symptoms?  Symptoms of this condition include:  · Sneezing.  · Runny or stuffy nose (nasal congestion).  · Postnasal drip.  · Itchy nose.  · Tearing of the eyes.  · Trouble sleeping.  · Daytime sleepiness.  How is this diagnosed?  This condition may be diagnosed based on:  · Your medical history.  · A physical exam.  · Tests to check for related conditions, such as:  ? Asthma.  ? Pink eye.  ? Ear infection.  ? Upper respiratory infection.  · Tests to find out which allergens trigger your symptoms. These may include skin or blood tests.  How is this treated?  There is no cure for this condition, but treatment can help control symptoms. Treatment may include:  · Taking  medicines that block allergy symptoms, such as antihistamines. Medicine may be given as a shot, nasal spray, or pill.  · Avoiding the allergen.  · Desensitization. This treatment involves getting ongoing shots until your body becomes less sensitive to the allergen. This treatment may be done if other treatments do not help.  · If taking medicine and avoiding the allergen does not work, new, stronger medicines may be prescribed.  Follow these instructions at home:  · Find out what you are allergic to. Common allergens include smoke, dust, and pollen.  · Avoid the things you are allergic to. These are some things you can do to help avoid allergens:  ? Replace carpet with wood, tile, or vinyl eli. Carpet can trap dander and dust.  ? Do not smoke. Do not allow smoking in your home.  ? Change your heating and air conditioning filter at least once a month.  ? During allergy season:  § Keep windows closed as much as possible.  § Plan outdoor activities when pollen counts are lowest. This is usually during the evening hours.  § When coming indoors, change clothing and shower before sitting on furniture or bedding.  · Take over-the-counter and prescription medicines only as told by your health care provider.  · Keep all follow-up visits as told by your health care provider. This is important.  Contact a health care provider if:  · You have a fever.  · You develop a persistent cough.  · You make whistling sounds when you breathe (you wheeze).  · Your symptoms interfere with your normal daily activities.  Get help right away if:  · You have shortness of breath.  Summary  · This condition can be managed by taking medicines as directed and avoiding allergens.  · Contact your health care provider if you develop a persistent cough or fever.  · During allergy season, keep windows closed as much as possible.  This information is not intended to replace advice given to you by your health care provider. Make sure you discuss any  questions you have with your health care provider.  Document Released: 09/12/2002 Document Revised: 01/25/2018 Document Reviewed: 01/25/2018  [employer]vier Interactive Patient Education © 2020 [employer]

## 2020-03-31 NOTE — PROGRESS NOTES
Subjective   Anthony Chau is a 49 y.o. female.     Patient has had a mild wet cough, sneezing, runny nose and sore throat. She thinks her symptoms may be related to allergies but she wants to be screened for COVID-19. She lives with multiple high-risk people and is concerned about spreading it to them.    Cough   This is a new problem. The current episode started in the past 7 days. The problem has been unchanged. The cough is productive of sputum (clear). Associated symptoms include postnasal drip and a sore throat.        The following portions of the patient's history were reviewed and updated as appropriate: allergies, current medications, past family history, past medical history, past social history, past surgical history and problem list.    Review of Systems   HENT: Positive for postnasal drip and sore throat.         Loss of taste and smell   Respiratory: Positive for cough.        Objective   Physical Exam  Virtual visit- no PE performed.    Assessment/Plan   Anthony was seen today for cough.    Diagnoses and all orders for this visit:    Cough        Per protocol advised she self-quarantine for 14 days. May use whatever typically helps her with seasonal allergies. If symptoms worsen she may do another video visit.

## 2020-04-06 ENCOUNTER — TELEPHONE (OUTPATIENT)
Dept: FAMILY MEDICINE CLINIC | Facility: TELEHEALTH | Age: 50
End: 2020-04-06

## 2020-04-06 NOTE — TELEPHONE ENCOUNTER
"NA when attempted to call after message \"her breathing was worse from 3/31 \"cough\" video visit.  Will send message to resubmit video visit or see PCP vs UC today  "

## 2020-04-06 NOTE — TELEPHONE ENCOUNTER
Yoess that reviewed chart message that felt like trouble breathing was anxiety, but advised if problem breathing is worrisome will need to proceed to Urgent Care today.

## 2020-04-08 DIAGNOSIS — M80.00XG OSTEOPOROSIS WITH CURRENT PATHOLOGICAL FRACTURE WITH DELAYED HEALING, UNSPECIFIED OSTEOPOROSIS TYPE, SUBSEQUENT ENCOUNTER: ICD-10-CM

## 2020-04-08 DIAGNOSIS — Z96.641 STATUS POST RIGHT HIP REPLACEMENT: ICD-10-CM

## 2020-05-08 DIAGNOSIS — F17.200 SMOKING ADDICTION: ICD-10-CM

## 2020-05-08 DIAGNOSIS — F41.9 ANXIETY: ICD-10-CM

## 2020-05-11 RX ORDER — BUPROPION HYDROCHLORIDE 150 MG/1
150 TABLET ORAL DAILY
Qty: 30 TABLET | Refills: 11 | Status: SHIPPED | OUTPATIENT
Start: 2020-05-11 | End: 2021-03-29 | Stop reason: SDUPTHER

## 2021-03-29 DIAGNOSIS — F41.9 ANXIETY: ICD-10-CM

## 2021-03-29 DIAGNOSIS — F17.200 SMOKING ADDICTION: ICD-10-CM

## 2021-03-29 RX ORDER — BUPROPION HYDROCHLORIDE 150 MG/1
150 TABLET ORAL DAILY
Qty: 30 TABLET | Refills: 0 | Status: SHIPPED | OUTPATIENT
Start: 2021-03-29 | End: 2021-04-19 | Stop reason: SDUPTHER

## 2021-04-07 DIAGNOSIS — Z96.641 STATUS POST RIGHT HIP REPLACEMENT: ICD-10-CM

## 2021-04-07 DIAGNOSIS — M80.00XG OSTEOPOROSIS WITH CURRENT PATHOLOGICAL FRACTURE WITH DELAYED HEALING, UNSPECIFIED OSTEOPOROSIS TYPE, SUBSEQUENT ENCOUNTER: ICD-10-CM

## 2021-04-07 RX ORDER — TERIPARATIDE 250 UG/ML
INJECTION, SOLUTION SUBCUTANEOUS
Qty: 2.4 ML | Refills: 1 | Status: SHIPPED | OUTPATIENT
Start: 2021-04-07

## 2021-04-19 ENCOUNTER — OFFICE VISIT (OUTPATIENT)
Dept: FAMILY MEDICINE CLINIC | Facility: CLINIC | Age: 51
End: 2021-04-19

## 2021-04-19 ENCOUNTER — LAB (OUTPATIENT)
Dept: LAB | Facility: HOSPITAL | Age: 51
End: 2021-04-19

## 2021-04-19 VITALS
WEIGHT: 139 LBS | OXYGEN SATURATION: 97 % | SYSTOLIC BLOOD PRESSURE: 128 MMHG | TEMPERATURE: 97.1 F | DIASTOLIC BLOOD PRESSURE: 80 MMHG | BODY MASS INDEX: 24.62 KG/M2 | HEART RATE: 77 BPM

## 2021-04-19 DIAGNOSIS — F41.9 ANXIETY: ICD-10-CM

## 2021-04-19 DIAGNOSIS — Z12.11 COLON CANCER SCREENING: ICD-10-CM

## 2021-04-19 DIAGNOSIS — Z11.59 ENCOUNTER FOR HEPATITIS C SCREENING TEST FOR LOW RISK PATIENT: ICD-10-CM

## 2021-04-19 DIAGNOSIS — L98.9 SKIN LESION OF BACK: ICD-10-CM

## 2021-04-19 DIAGNOSIS — Z12.83 SKIN CANCER SCREENING: ICD-10-CM

## 2021-04-19 DIAGNOSIS — Z78.0 MENOPAUSE: ICD-10-CM

## 2021-04-19 DIAGNOSIS — Z00.00 ANNUAL PHYSICAL EXAM: Primary | ICD-10-CM

## 2021-04-19 DIAGNOSIS — Z12.31 ENCOUNTER FOR SCREENING MAMMOGRAM FOR MALIGNANT NEOPLASM OF BREAST: ICD-10-CM

## 2021-04-19 DIAGNOSIS — F17.200 SMOKING ADDICTION: ICD-10-CM

## 2021-04-19 DIAGNOSIS — Z00.00 HEALTHCARE MAINTENANCE: ICD-10-CM

## 2021-04-19 DIAGNOSIS — M81.6 LOCALIZED OSTEOPOROSIS WITHOUT CURRENT PATHOLOGICAL FRACTURE: ICD-10-CM

## 2021-04-19 DIAGNOSIS — H61.22 IMPACTED CERUMEN OF LEFT EAR: ICD-10-CM

## 2021-04-19 DIAGNOSIS — E55.9 VITAMIN D DEFICIENCY: ICD-10-CM

## 2021-04-19 LAB
ALBUMIN SERPL-MCNC: 4.7 G/DL (ref 3.5–5.2)
ALBUMIN/GLOB SERPL: 1.8 G/DL
ALP SERPL-CCNC: 87 U/L (ref 39–117)
ALT SERPL W P-5'-P-CCNC: 14 U/L (ref 1–33)
ANION GAP SERPL CALCULATED.3IONS-SCNC: 10.1 MMOL/L (ref 5–15)
AST SERPL-CCNC: 19 U/L (ref 1–32)
BASOPHILS # BLD AUTO: 0.05 10*3/MM3 (ref 0–0.2)
BASOPHILS NFR BLD AUTO: 0.5 % (ref 0–1.5)
BILIRUB SERPL-MCNC: 0.2 MG/DL (ref 0–1.2)
BUN SERPL-MCNC: 16 MG/DL (ref 6–20)
BUN/CREAT SERPL: 18 (ref 7–25)
CALCIUM SPEC-SCNC: 9.6 MG/DL (ref 8.6–10.5)
CHLORIDE SERPL-SCNC: 104 MMOL/L (ref 98–107)
CHOLEST SERPL-MCNC: 279 MG/DL (ref 0–200)
CO2 SERPL-SCNC: 26.9 MMOL/L (ref 22–29)
CREAT SERPL-MCNC: 0.89 MG/DL (ref 0.57–1)
DEPRECATED RDW RBC AUTO: 41.1 FL (ref 37–54)
EOSINOPHIL # BLD AUTO: 0.08 10*3/MM3 (ref 0–0.4)
EOSINOPHIL NFR BLD AUTO: 0.8 % (ref 0.3–6.2)
ERYTHROCYTE [DISTWIDTH] IN BLOOD BY AUTOMATED COUNT: 12.4 % (ref 12.3–15.4)
GFR SERPL CREATININE-BSD FRML MDRD: 67 ML/MIN/1.73
GLOBULIN UR ELPH-MCNC: 2.6 GM/DL
GLUCOSE SERPL-MCNC: 81 MG/DL (ref 65–99)
HCT VFR BLD AUTO: 43.3 % (ref 34–46.6)
HDLC SERPL-MCNC: 64 MG/DL (ref 40–60)
HGB BLD-MCNC: 14.9 G/DL (ref 12–15.9)
IMM GRANULOCYTES # BLD AUTO: 0.03 10*3/MM3 (ref 0–0.05)
IMM GRANULOCYTES NFR BLD AUTO: 0.3 % (ref 0–0.5)
LDLC SERPL CALC-MCNC: 190 MG/DL (ref 0–100)
LDLC/HDLC SERPL: 2.93 {RATIO}
LYMPHOCYTES # BLD AUTO: 3.03 10*3/MM3 (ref 0.7–3.1)
LYMPHOCYTES NFR BLD AUTO: 29.3 % (ref 19.6–45.3)
MCH RBC QN AUTO: 31.6 PG (ref 26.6–33)
MCHC RBC AUTO-ENTMCNC: 34.4 G/DL (ref 31.5–35.7)
MCV RBC AUTO: 91.9 FL (ref 79–97)
MONOCYTES # BLD AUTO: 0.62 10*3/MM3 (ref 0.1–0.9)
MONOCYTES NFR BLD AUTO: 6 % (ref 5–12)
NEUTROPHILS NFR BLD AUTO: 6.52 10*3/MM3 (ref 1.7–7)
NEUTROPHILS NFR BLD AUTO: 63.1 % (ref 42.7–76)
NRBC BLD AUTO-RTO: 0 /100 WBC (ref 0–0.2)
PLATELET # BLD AUTO: 282 10*3/MM3 (ref 140–450)
PMV BLD AUTO: 10.7 FL (ref 6–12)
POTASSIUM SERPL-SCNC: 4.9 MMOL/L (ref 3.5–5.2)
PROT SERPL-MCNC: 7.3 G/DL (ref 6–8.5)
RBC # BLD AUTO: 4.71 10*6/MM3 (ref 3.77–5.28)
SODIUM SERPL-SCNC: 141 MMOL/L (ref 136–145)
TRIGL SERPL-MCNC: 138 MG/DL (ref 0–150)
TSH SERPL DL<=0.05 MIU/L-ACNC: 1.35 UIU/ML (ref 0.27–4.2)
VLDLC SERPL-MCNC: 25 MG/DL (ref 5–40)
WBC # BLD AUTO: 10.33 10*3/MM3 (ref 3.4–10.8)

## 2021-04-19 PROCEDURE — 82306 VITAMIN D 25 HYDROXY: CPT

## 2021-04-19 PROCEDURE — 99396 PREV VISIT EST AGE 40-64: CPT | Performed by: NURSE PRACTITIONER

## 2021-04-19 PROCEDURE — 80050 GENERAL HEALTH PANEL: CPT

## 2021-04-19 PROCEDURE — 80061 LIPID PANEL: CPT

## 2021-04-19 PROCEDURE — 86803 HEPATITIS C AB TEST: CPT

## 2021-04-19 RX ORDER — BUPROPION HYDROCHLORIDE 150 MG/1
150 TABLET ORAL DAILY
Qty: 90 TABLET | Refills: 3 | Status: SHIPPED | OUTPATIENT
Start: 2021-04-19

## 2021-04-19 RX ORDER — BUPROPION HYDROCHLORIDE 150 MG/1
150 TABLET ORAL DAILY
Qty: 30 TABLET | Refills: 0 | Status: SHIPPED | OUTPATIENT
Start: 2021-04-19 | End: 2021-04-19 | Stop reason: SDUPTHER

## 2021-04-19 NOTE — PATIENT INSTRUCTIONS
Eating Plan for Osteoporosis  Osteoporosis causes your bones to become weak and brittle. This puts you at greater risk for bone breaks (fractures) from small bumps or falls. Making changes to your diet and increasing your physical activity can help strengthen your bones and improve your overall health.  Calcium and vitamin D are nutrients that play an important role in bone health. Vitamin D helps your body use calcium and strengthen bones. Therefore, it is important to get enough calcium and vitamin D as part of your eating plan for osteoporosis.  What are tips for following this plan?  Reading food labels  · Try to get at least 1,000 milligrams (mg) of calcium each day.  · Look for foods that have at least 50 mg of calcium per serving.  · Talk with your health care provider about taking a calcium supplement if you do not get enough calcium from food.  · Do not have more than 2,500 mg of calcium each day. This is the upper limit for food and nutritional supplements combined. Too much calcium may cause constipation and prevent you from absorbing other important nutrients.  · Choose foods that contain vitamin D.  · Take a daily vitamin supplement that contains 800-1,000 international units (IU) of vitamin D. The amount may be different depending on your age, body weight, ethnicity, and where you live. Talk with your dietitian or health care provider about how much vitamin D is right for you.  · Avoid foods that have more than 300 mg of sodium per serving. Too much sodium can cause your body to lose calcium.  · Talk with your dietitian or health care provider about how much sodium you are allowed each day.  Shopping  · Do not buy foods with added salt, including:  ? Salted snacks.  ? Pickles.  ? Canned soups.  ? Canned meats.  ? Processed meats, such as fernández or cold cuts.  ? Smoked fish.  Meal planning  · Eat balanced meals that contain protein foods, fruits and vegetables, and foods rich in calcium and vitamin  D.  · Eat at least 5 servings of fruits and vegetables each day.  · Eat 5-6 oz. of lean meat, poultry, fish, eggs, or beans each day.  Lifestyle  · Do not use any products that contain nicotine or tobacco, such as cigarettes and e-cigarettes. If you need help quitting, ask your health care provider.  · If your health care provider recommends that you lose weight:  ? Work with a dietitian to develop an eating plan that will help you reach your desired weight goal.  ? Exercise for at least 30 minutes a day, 5 or more days a week, or as told by your health care provider.  · Work with a physical therapist to develop an exercise plan that includes flexibility, balance, and strength exercises.  · If you drink alcohol, limit how much you have. This means:  ? 0-1 drink a day for women.  ? 0-2 drinks a day for men.  ? Be aware of how much alcohol is in your drink. In the [place] one drink equals one typical bottle of beer (12 oz), one-half glass of wine (5 oz), or one shot of hard liquor (1½ oz).  What foods should I eat?  Foods high in calcium    · Yogurt. Yogurt with fruit.  · Milk. Evaporated skim milk. Dry milk powder.  · Calcium-fortified orange juice.  · Parmesan cheese. Part-skim ricotta cheese. Natural hard cheese. Cream cheese. Cottage cheese.  · Canned sardines. Canned salmon.  · Calcium-treated tofu. Calcium-fortified cereal bar. Calcium-fortified cereal. Calcium-fortified odilon crackers.  · Cooked cherry greens. Turnip greens. Broccoli. Kale.  · Almonds.  · White beans.  · Corn tortilla.  Foods high in vitamin D  · Cod liver oil. Fatty fish, such as tuna, mackerel, and salmon.  · Milk. Fortified soy milk. Fortified fruit juice.  · Yogurt. Margarine.  · Egg yolks.  Foods high in protein  · Beef. Lamb. Pork tenderloin.  · Chicken breast.  · Tuna (canned). Fish fillet.  · Tofu.   · Soy beans (cooked). Soy stephania. Beans (canned or cooked).  · Cottage cheese.  · Yogurt.  · Peanut butter.  · Pumpkin seeds. Nuts.  Sunflower seeds.  · Hard cheese.  · Milk or other milk products, such as soy milk.  The items listed above may not be a complete list of foods and beverages you can eat. Contact a dietitian for more options.  Summary  · Calcium and vitamin D are nutrients that play an important role in bone health and are an important part of your eating plan for osteoporosis.  · Eat balanced meals that contain protein foods, fruits and vegetables, and foods rich in calcium and vitamin D.  · Avoid foods that have more than 300 mg of sodium per serving. Too much sodium can cause your body to lose calcium.  · Exercise is an important part of prevention and treatment of osteoporosis. Aim for at least 30 minutes a day, 5 days a week.  This information is not intended to replace advice given to you by your health care provider. Make sure you discuss any questions you have with your health care provider.  Document Revised: 02/25/2019 Document Reviewed: 02/25/2019  [employer] Patient Education © 2021 [employer]    Immunization Schedule, 50-64 Years Old    Vaccines are usually given at various ages, according to a schedule. Your health care provider will recommend vaccines for you based on your age, medical history, and lifestyle or other factors such as travel or where you work.  You may receive vaccines as individual doses or as more than one vaccine together in one shot (combination vaccines). Talk with your health care provider about the risks and benefits of combination vaccines.  Recommended immunizations for 50-64 years old  Influenza vaccine  · You should get a dose of the influenza vaccine every year.  Tetanus, diphtheria, and pertussis vaccine  A vaccine that protects against tetanus, diphtheria, and pertussis is known as the Tdap vaccine. A vaccine that protects against tetanus and diphtheria is known as the Td vaccine.  · You should only get the Td vaccine if you have had at least 1 dose of the Tdap vaccine.  · You should get 1  dose of the Td or Tdap vaccine every 10 years, or you should get 1 dose of the Tdap vaccine if:  ? You have not previously gotten a Tdap vaccine.  ? You do not know if you have ever gotten a Tdap vaccine.  Zoster vaccine  This is also known as the RZV vaccine. You should get 2 doses of the RZV vaccine 2 to 6 months apart. It is important to get the RZV vaccine even if you:  · Have had shingles.  · Have received the ZVL vaccine, an older version of the RZV vaccine.  · Are unsure if you have had chickenpox (varicella).  Pneumococcal conjugate vaccine  This is also known as the PCV13 vaccine. You should get the PCV13 vaccine as recommended if you have certain high-risk conditions. These include:  · Diabetes.  · Chronic conditions of the heart, lungs, or liver.  · Conditions that affect the body's disease-fighting system (immune system).  Pneumococcal polysaccharide vaccine  This is also known as the PPSV23 vaccine. You should get the PPSV23 vaccine as recommended if you have certain high-risk conditions. These include:  · Diabetes.  · Chronic conditions of the heart, lungs, or liver.  · Conditions that affect the immune system.  Hepatitis A vaccine  This is also known as the HepA vaccine. If you did not get the HepA vaccine previously, you should get it if:  · You are at risk for a hepatitis A infection. You may be at risk for infection if you:  ? Have chronic liver disease.  ? Have HIV or AIDS.  ? Are a man who has sex with men.  ? Use drugs.  ? Are homeless.  ? May be exposed to hepatitis A through work.  ? Travel to countries where hepatitis A is common.  ? Have or will have close contact with someone who was adopted from another country.  · You are not at risk for infection but want protection from hepatitis A.  Hepatitis B vaccine  This is also known as the HepB vaccine. If you did not get the HepB vaccine previously, you should get it if:  · You are at risk for hepatitis B infection. You are at risk if  you:  ? Have chronic liver disease.  ? Have HIV or AIDS.  ? Have sex with a partner who has hepatitis B, or:  § You have multiple sex partners.  § You are a man who has sex with men.  ? Use drugs.  ? May be exposed to hepatitis B through work.  ? Live with someone who has hepatitis B.  ? Receive dialysis treatment.  ? Have diabetes.  ? Travel to countries where hepatitis B is common.  · You are not at risk of infection but want protection from hepatitis B.  Measles, mumps, and rubella vaccine  This is also known as the MMR vaccine. You may need to get the MMR vaccine if you were born in 1957 or later and:  · You need to catch up on doses you missed in the past.  · You have not been given the vaccine before.  · You do not have evidence of immunity (by a blood test).  Varicella vaccine  This is also known as the ADONIS vaccine. You may need to get the ADONIS vaccine if you do not have evidence of immunity (by a blood test) and you may be exposed to varicella through work. This is especially important if you work in a health care setting.  Meningococcal conjugate vaccine  This is also known as the MenACWY vaccine. You may need to get the MenACWY vaccine if you:  · Have not been given the vaccine before.  · Need to catch up on doses you missed in the past.  This vaccine is especially important if you:  · Do not have a spleen.  · Have sickle cell disease.  · Have HIV.  · Take medicines that suppress your immune system.  · Travel to countries where meningococcal disease is common.  · Are exposed to Neisseria meningitidis at work.  Serogroup B meningococcal vaccine  This is also known as the MenB vaccine. You may need to get the MenB vaccine if you:  · Have not been given the vaccine before.  · Need to catch up on doses you missed in the past.  This vaccine is especially important if you:  · Do not have a spleen.  · Have sickle cell disease.  · Take medicines that suppress your immune system.  · Are exposed to Neisseria  meningitidis at work.  Haemophilus influenzae type b vaccine  This is also known as the Hib vaccine. Anyone older than 5 years of age is usually not given the Hib vaccine. However, if you have certain high-risk conditions, you may need to get this vaccine. These conditions include:  · Not having a spleen.  · Having received a stem cell transplant.  Before you get a vaccine:  Talk with your health care provider about which vaccines are right for you. This is especially important if:  · You previously had a reaction after getting a vaccine.  · You have a weakened immune system. You may have a weakened immune system if you:  ? Are taking medicines that reduce (suppress) the activity of your immune system.  ? Are taking medicines to treat cancer (chemotherapy).  ? Have HIV or AIDS.  · You work in an environment where you may be exposed to a disease.  · You plan to travel outside of the country.  · You have a chronic illness, such as heart disease, kidney disease, diabetes, or lung disease.  Summary  · Before you get a vaccine, tell your health care provider if you have reacted to vaccines in the past or have a condition that weakens your immune system.  · At 50-64 years, you should get a dose of the flu vaccine every year and a dose of the Td or Tdap vaccine every 10 years.  · You should get 2 doses of the RZV vaccine 2 to 6 months apart.  · Depending on your medical history and your risk factors, you may need other vaccines. Ask your health care provider whether you are up to date on all your vaccines.  This information is not intended to replace advice given to you by your health care provider. Make sure you discuss any questions you have with your health care provider.  Document Revised: 10/13/2020 Document Reviewed: 10/13/2020  [employer]vier Patient Education © 2021 [employer]    Diet for Irritable Bowel Syndrome  When you have irritable bowel syndrome (IBS), it is very important to eat the foods and follow the eating  "habits that are best for your condition. IBS may cause various symptoms such as pain in the abdomen, constipation, or diarrhea. Choosing the right foods can help to ease the discomfort from these symptoms. Work with your health care provider and diet and nutrition specialist (dietitian) to find the eating plan that will help to control your symptoms.  What are tips for following this plan?         · Keep a food diary. This will help you identify foods that cause symptoms. Write down:  ? What you eat and when you eat it.  ? What symptoms you have.  ? When symptoms occur in relation to your meals, such as \"pain in abdomen 2 hours after dinner.\"  · Eat your meals slowly and in a relaxed setting.  · Aim to eat 5-6 small meals per day. Do not skip meals.  · Drink enough fluid to keep your urine pale yellow.  · Ask your health care provider if you should take an over-the-counter probiotic to help restore healthy bacteria in your gut (digestive tract).  ? Probiotics are foods that contain good bacteria and yeasts.  · Your dietitian may have specific dietary recommendations for you based on your symptoms. He or she may recommend that you:  ? Avoid foods that cause symptoms. Talk with your dietitian about other ways to get the same nutrients that are in those problem foods.  ? Avoid foods with gluten. Gluten is a protein that is found in rye, wheat, and barley.  ? Eat more foods that contain soluble fiber. Examples of foods with high soluble fiber include oats, seeds, and certain fruits and vegetables. Take a fiber supplement if directed by your dietitian.  ? Reduce or avoid certain foods called FODMAPs. These are foods that contain carbohydrates that are hard to digest. Ask your doctor which foods contain these carbohydrates.  What foods are not recommended?  The following are some foods and drinks that may make your symptoms worse:  · Fatty foods, such as french fries.  · Foods that contain gluten, such as pasta and " cereal.  · Dairy products, such as milk, cheese, and ice cream.  · Chocolate.  · Alcohol.  · Products with caffeine, such as coffee.  · Carbonated drinks, such as soda.  · Foods that are high in FODMAPs. These include certain fruits and vegetables.  · Products with sweeteners such as honey, high fructose corn syrup, sorbitol, and mannitol.  The items listed above may not be a complete list of foods and beverages you should avoid. Contact a dietitian for more information.  What foods are good sources of fiber?  Your health care provider or dietitian may recommend that you eat more foods that contain fiber. Fiber can help to reduce constipation and other IBS symptoms. Add foods with fiber to your diet a little at a time so your body can get used to them. Too much fiber at one time might cause gas and swelling of your abdomen. The following are some foods that are good sources of fiber:  · Berries, such as raspberries, strawberries, and blueberries.  · Tomatoes.  · Carrots.  · Brown rice.  · Oats.  · Seeds, such as devora and pumpkin seeds.  The items listed above may not be a complete list of recommended sources of fiber. Contact your dietitian for more options.  Where to find more information  · International Foundation for Functional Gastrointestinal Disorders: www.iffgd.org  · National Cades of Diabetes and Digestive and Kidney Diseases: www.niddk.nih.gov  Summary  · When you have irritable bowel syndrome (IBS), it is very important to eat the foods and follow the eating habits that are best for your condition.  · IBS may cause various symptoms such as pain in the abdomen, constipation, or diarrhea.  · Choosing the right foods can help to ease the discomfort that comes from symptoms.  · Keep a food diary. This will help you identify foods that cause symptoms.  · Your health care provider or diet and nutrition specialist (dietitian) may recommend that you eat more foods that contain fiber.  This information is not  intended to replace advice given to you by your health care provider. Make sure you discuss any questions you have with your health care provider.  Document Revised: 04/08/2020 Document Reviewed: 08/21/2018  [employer] Patient Education © 2021 [employer]    Low-FODMAP Eating Plan    FODMAPs (fermentable oligosaccharides, disaccharides, monosaccharides, and polyols) are sugars that are hard for some people to digest. A low-FODMAP eating plan may help some people who have bowel (intestinal) diseases to manage their symptoms.  This meal plan can be complicated to follow. Work with a diet and nutrition specialist (dietitian) to make a low-FODMAP eating plan that is right for you. A dietitian can make sure that you get enough nutrition from this diet.  What are tips for following this plan?  Reading food labels  · Check labels for hidden FODMAPs such as:  ? High-fructose syrup.  ? Honey.  ? Agave.  ? Natural fruit flavors.  ? Onion or garlic powder.  · Choose low-FODMAP foods that contain 3-4 grams of fiber per serving.  · Check food labels for serving sizes. Eat only one serving at a time to make sure FODMAP levels stay low.  Meal planning  · Follow a low-FODMAP eating plan for up to 6 weeks, or as told by your health care provider or dietitian.  · To follow the eating plan:  1. Eliminate high-FODMAP foods from your diet completely.  2. Gradually reintroduce high-FODMAP foods into your diet one at a time. Most people should wait a few days after introducing one high-FODMAP food before they introduce the next high-FODMAP food. Your dietitian can recommend how quickly you may reintroduce foods.  3. Keep a daily record of what you eat and drink, and make note of any symptoms that you have after eating.  4. Review your daily record with a dietitian regularly. Your dietitian can help you identify which foods you can eat and which foods you should avoid.  General tips  · Drink enough fluid each day to keep your urine pale  "yellow.  · Avoid processed foods. These often have added sugar and may be high in FODMAPs.  · Avoid most dairy products, whole grains, and sweeteners.  · Work with a dietitian to make sure you get enough fiber in your diet.  Recommended foods  Grains  · Gluten-free grains, such as rice, oats, buckwheat, quinoa, corn, polenta, and millet. Gluten-free pasta, bread, or cereal. Rice noodles. Corn tortillas.  Vegetables  · Eggplant, zucchini, cucumber, peppers, green beans, [place] sprouts, bean sprouts, lettuce, arugula, kale, Swiss chard, spinach, cherry greens, bok sammy, summer squash, potato, and tomato. Limited amounts of corn, carrot, and sweet potato. Green parts of scallions.  Fruits  · Bananas, oranges, félix, limes, blueberries, raspberries, strawberries, grapes, cantaloupe, honeydew melon, kiwi, papaya, passion fruit, and pineapple. Limited amounts of dried cranberries, banana chips, and shredded coconut.  Dairy  · Lactose-free milk, yogurt, and kefir. Lactose-free cottage cheese and ice cream. Non-dairy milks, such as almond, coconut, hemp, and rice milk. Yogurts made of non-dairy milks. Limited amounts of goat cheese, brie, mozzarella, parmesan, swiss, and other hard cheeses.  Meats and other protein foods  · Unseasoned beef, pork, poultry, or fish. Eggs. Duncan. Tofu (firm) and tempeh. Limited amounts of nuts and seeds, such as almonds, walnuts, brazil nuts, pecans, peanuts, pumpkin seeds, devora seeds, and sunflower seeds.  Fats and oils  · Butter-free spreads. Vegetable oils, such as olive, canola, and sunflower oil.  Seasoning and other foods  · Artificial sweeteners with names that do not end in \"ol\" such as aspartame, saccharine, and stevia. Maple syrup, white table sugar, raw sugar, brown sugar, and molasses. Fresh basil, coriander, parsley, rosemary, and thyme.  Beverages  · Water and mineral water. Sugar-sweetened soft drinks. Small amounts of orange juice or cranberry juice. Black and green tea. " Most dry anat. Coffee.  This may not be a complete list of low-FODMAP foods. Talk with your dietitian for more information.  Foods to avoid  Grains  · Wheat, including kamut, durum, and semolina. Barley and bulgur. Couscous. Wheat-based cereals. Wheat noodles, bread, crackers, and pastries.  Vegetables  · Chicory root, artichoke, asparagus, cabbage, snow peas, sugar snap peas, mushrooms, and cauliflower. Onions, garlic, leeks, and the white part of scallions.  Fruits  · Fresh, dried, and juiced forms of apple, pear, watermelon, peach, plum, cherries, apricots, blackberries, boysenberries, figs, nectarines, and agustin. Avocado.  Dairy  · Milk, yogurt, ice cream, and soft cheese. Cream and sour cream. Milk-based sauces. Custard.  Meats and other protein foods  · Fried or fatty meat. Sausage. Cashews and pistachios. Soybeans, baked beans, black beans, chickpeas, kidney beans, charli beans, navy beans, lentils, and split peas.  Seasoning and other foods  · Any sugar-free gum or candy. Foods that contain artificial sweeteners such as sorbitol, mannitol, isomalt, or xylitol. Foods that contain honey, high-fructose corn syrup, or agave. Bouillon, vegetable stock, beef stock, and chicken stock. Garlic and onion powder. Condiments made with onion, such as hummus, chutney, pickles, relish, salad dressing, and salsa. Tomato paste.  Beverages  · Chicory-based drinks. Coffee substitutes. Chamomile tea. Fennel tea. Sweet or fortified anat such as port or tahira. Diet soft drinks made with isomalt, mannitol, maltitol, sorbitol, or xylitol. Apple, pear, and agustin juice. Juices with high-fructose corn syrup.  This may not be a complete list of high-FODMAP foods. Talk with your dietitian to discuss what dietary choices are best for you.   Summary  · A low-FODMAP eating plan is a short-term diet that eliminates FODMAPs from your diet to help ease symptoms of certain bowel diseases.  · The eating plan usually lasts up to 6 weeks. After  that, high-FODMAP foods are restarted gradually, one at a time, so you can find out which may be causing symptoms.  · A low-FODMAP eating plan can be complicated. It is best to work with a dietitian who has experience with this type of plan.  This information is not intended to replace advice given to you by your health care provider. Make sure you discuss any questions you have with your health care provider.  Document Revised: 11/30/2018 Document Reviewed: 08/14/2018  Elsevier Patient Education © 2021 [employer]

## 2021-04-19 NOTE — PROGRESS NOTES
Patient Care Team:  Hollie Chapa APRN as PCP - General (Nurse Practitioner)  Levon Robertson MD as Obstetrician (Obstetrics and Gynecology)  Alejandro Hernandez MD as Consulting Physician (Orthopedic Surgery)     Chief complaint: Patient is in today for a physical     Patient is a 51 y.o. female who presents for her yearly physical exam.   Chief Complaint   Patient presents with   • Annual Exam     She reports she has been experiencing flare up in axienty and GI issues. She refrans from eating often because she stomach with cause her pain due to stress.        HPI   Patient is here for physical, has been using Forteo intermittently for past year, regularly for the year before; eats a healthy diet, drinks a lot of coffee throughout the day. Eats fresh fruits and vegetables, has urgency of bowels occ, worsening situational anxiety, occ diarrhea.    Has a couple of skin lesions on her back that have changed in color, wants to see derm  Review of Systems   Constitutional: Negative for chills, fatigue and fever.   Eyes: Positive for visual disturbance (occ).   Respiratory: Negative for cough, shortness of breath and wheezing.    Cardiovascular: Negative for chest pain, palpitations and leg swelling.   Gastrointestinal: Positive for diarrhea and rectal pain (spasms with urgency of bowels). Negative for abdominal pain, blood in stool, constipation, nausea and vomiting.   Endocrine: Positive for heat intolerance.   Genitourinary: Negative for difficulty urinating, dysuria and vaginal bleeding (no period in 5 years).   Musculoskeletal: Positive for arthralgias and back pain (b/t shoulder blades).   Skin: Positive for color change (back lesions) and rash (occ heat rash).   Neurological: Negative for dizziness, light-headedness, numbness and headaches.   Psychiatric/Behavioral: Positive for sleep disturbance (occ). Negative for dysphoric mood. The patient is nervous/anxious.       History  Past Medical History:    Diagnosis Date   • Anxiety    • Depression    • H/O blood clots    • History of low back pain    • HPV (human papilloma virus) infection    • Muscle weakness    • Skin cancer    • Uterine fibroid       Past Surgical History:   Procedure Laterality Date   • DIAGNOSTIC LAPAROSCOPY     • ENDOMETRIAL ABLATION  2006   • HIP TROCHANTERIC NAILING WITH INTRAMEDULLARY HIP SCREW Right 7/4/2018    Procedure: HIP TROCANTERIC NAILING WITH INTRAMEDULLARY HIP SCREW;  Surgeon: Alejandro Hernandez MD;  Location: [facility];  Service: Orthopedics   • MULTIPLE TOOTH EXTRACTIONS     • TOTAL HIP ARTHROPLASTY Right     03/21/2019   • TUBAL ABDOMINAL LIGATION        Allergies   Allergen Reactions   • Betadine [Povidone Iodine] Swelling     Used in nostrils before surgery, OK on skin      Family History   Problem Relation Age of Onset   • Osteoporosis Other    • Arthritis Mother    • Asthma Mother    • Cancer Mother    • COPD Mother    • Hyperlipidemia Mother    • Hypertension Mother    • Kidney disease Mother    • Miscarriages / Stillbirths Mother    • Osteoporosis Mother    • Cancer Father 74        bladder   • Hearing loss Father    • Arthritis Maternal Aunt    • Osteoporosis Maternal Aunt    • Cancer Paternal Aunt    • Osteoporosis Paternal Aunt    • Alcohol abuse Maternal Grandmother    • Arthritis Maternal Grandmother    • Diabetes Maternal Grandmother    • Hyperlipidemia Maternal Grandmother    • Hypertension Maternal Grandmother    • Osteoporosis Maternal Grandmother    • COPD Paternal Grandmother      Social History     Socioeconomic History   • Marital status: Significant Other     Spouse name: Not on file   • Number of children: Not on file   • Years of education: Not on file   • Highest education level: Not on file   Tobacco Use   • Smoking status: Current Some Day Smoker     Packs/day: 0.50     Years: 30.00     Pack years: 15.00     Types: Cigarettes   • Smokeless tobacco: Never Used   Substance and Sexual Activity   • Alcohol  use: Yes     Comment: Occasional   • Drug use: Yes     Types: Marijuana     Comment: occ   • Sexual activity: Defer        Current Outpatient Medications:   •  buPROPion XL (WELLBUTRIN XL) 150 MG 24 hr tablet, Take 1 tablet by mouth Daily., Disp: 90 tablet, Rfl: 3  •  Forteo 600 MCG/2.4ML injection, INJECT 0.08 ML UNDER THE SKIN AS DIRECTED DAILY, Disp: 2.4 mL, Rfl: 1  •  Insulin Pen Needle 32G X 4 MM misc, Use 1 needle Daily., Disp: 100 each, Rfl: 3  •  polyethylene glycol (MIRALAX) powder, Take 17 g by mouth Daily As Needed (constipation)., Disp: 225 g, Rfl: 1  •  Calcium Carbonate-Vitamin D (OSCAL-500) 500-400 MG-UNIT per tablet, Take 2 tablets by mouth Daily., Disp: , Rfl:     Immunizations   N/A   Prescribed/Refused   Date     Td/Tdap  (Booster Q 10 yrs)   []           Prescribed    []     Refused        []           Flu  (Yearly)   []        Prescribed    []     Refused        []           Pneumovax  (1 yr after Prevnar)   []        Prescribed    []     Refused        []           Prevnar 13  (1 yr after Pneumo)   []        Prescribed    []     Refused        []           Hep B     []        Prescribed    []     Refused        []           Shingles  (Age 50 and older)   []        Prescribed    []     Refused        []           Immunization History   Administered Date(s) Administered   • Flu Vaccine Quad PF >36MO 09/26/2019   • Flulaval/Fluarix/Fluzone Quad 09/26/2019     Health Maintenance   Topic Date Due   • COLONOSCOPY  Never done   • Pneumococcal Vaccine 0-64 (1 of 1 - PPSV23) Never done   • COVID-19 Vaccine (1) Never done   • TDAP/TD VACCINES (1 - Tdap) Never done   • MAMMOGRAM  Never done   • ZOSTER VACCINE (1 of 2) Never done   • DXA SCAN  07/26/2020   • ANNUAL PHYSICAL  09/27/2020   • INFLUENZA VACCINE  08/01/2021   • PAP SMEAR  09/26/2022   • HEPATITIS C SCREENING  Completed        Diabetes  [] Yes  [] No   N/A      Date     Eye Exam     []             []   Complete     []   Recommended  Date:  Where:       Foot Exam     []         []   Complete          Obesity Counseling     []       []   Complete     No results found for: HGBA1C, MICROALBUR    Additional Testing      Date     Colorectal Screening       []   N/A   []   Complete    []   Ordered     Date:    Where:       Pap      []   N/A   []   Complete   []   OB/GYN Date:    Where:       Mammogram        []   N/A   []   Complete   []  OB/GYN   []   Ordered Date:    Where:     PSA  (Over age 50)    []   N/A   []   Complete   []   Ordered Date:    Where:     US Aorta  (For male smokers, age 65)     []   N/A   []   Complete   []   Ordered Date:    Where:     CT for Smoker  (Age 55-75, 30 pk yr)    []   N/A   []   Complete   []   Ordered Date:    Where:     Bone Density/DEXA      []   N/A   []   Complete   []   Ordered Date:    Follow-up:     Hep. C        []   N/A   []   Complete   []   Ordered Date:    Where:     Results for orders placed or performed in visit on 12/03/19   POC Urinalysis Dipstick, Automated    Specimen: Urine   Result Value Ref Range    Color Dark Yellow Yellow, Straw, Dark Yellow, Shanna    Clarity, UA Clear Clear    Specific Gravity  1.030 1.005 - 1.030    pH, Urine 6.0 5.0 - 8.0    Leukocytes Negative Negative    Nitrite, UA Negative Negative    Protein, POC Trace (A) Negative mg/dL    Glucose, UA Negative Negative, 1000 mg/dL (3+) mg/dL    Ketones, UA Trace (A) Negative    Urobilinogen, UA Normal Normal    Bilirubin Negative Negative    Blood, UA Negative Negative            /80   Pulse 77   Temp 97.1 °F (36.2 °C)   Wt 63 kg (139 lb)   SpO2 97%   BMI 24.62 kg/m²       Physical Exam  Vitals and nursing note reviewed.   Constitutional:       General: She is not in acute distress.     Appearance: Normal appearance. She is well-developed. She is not diaphoretic.   HENT:      Head: Normocephalic and atraumatic.      Right Ear: External ear normal.      Left Ear: External ear normal. There is impacted cerumen.      Nose:  Nose normal.   Eyes:      General: No scleral icterus.        Right eye: No discharge.         Left eye: No discharge.      Conjunctiva/sclera: Conjunctivae normal.      Pupils: Pupils are equal, round, and reactive to light.   Neck:      Thyroid: No thyromegaly.      Vascular: No carotid bruit or JVD.      Trachea: No tracheal deviation.   Cardiovascular:      Rate and Rhythm: Normal rate and regular rhythm.      Heart sounds: Normal heart sounds. No murmur heard.   No friction rub. No gallop.    Pulmonary:      Effort: Pulmonary effort is normal. No respiratory distress.      Breath sounds: Normal breath sounds. No stridor. No wheezing or rales.   Chest:      Chest wall: No tenderness.      Breasts:         Right: Normal. No swelling, bleeding, inverted nipple, mass, nipple discharge, skin change or tenderness.         Left: No swelling, bleeding, inverted nipple, mass, nipple discharge, skin change or tenderness.   Abdominal:      General: Bowel sounds are normal. There is no distension.      Palpations: Abdomen is soft. There is no mass.      Tenderness: There is no abdominal tenderness. There is no guarding or rebound.      Hernia: No hernia is present.   Musculoskeletal:         General: No tenderness or deformity.      Cervical back: Normal range of motion and neck supple.   Lymphadenopathy:      Cervical: No cervical adenopathy.      Upper Body:      Right upper body: No supraclavicular, axillary or pectoral adenopathy.      Left upper body: No supraclavicular, axillary or pectoral adenopathy.   Skin:     General: Skin is warm and dry.      Coloration: Skin is not pale.      Findings: Erythema and rash (round lesion nickel size on right upper back ) present.   Neurological:      Mental Status: She is alert and oriented to person, place, and time.      Cranial Nerves: No cranial nerve deficit.      Motor: No abnormal muscle tone.      Coordination: Coordination normal.      Deep Tendon Reflexes: Reflexes are  normal and symmetric. Reflexes normal.   Psychiatric:         Behavior: Behavior normal.         Thought Content: Thought content normal.         Judgment: Judgment normal.               Counseling provided on diet and nutrition, exercise, weight management, prevention of cardiac or vascular disease, tobacco cessation, mental health concerns and anxiety.    Diagnoses and all orders for this visit:    Annual physical exam    Anxiety  -     Discontinue: buPROPion XL (WELLBUTRIN XL) 150 MG 24 hr tablet; Take 1 tablet by mouth Daily.  -     Ambulatory Referral to Behavioral Health  -     buPROPion XL (WELLBUTRIN XL) 150 MG 24 hr tablet; Take 1 tablet by mouth Daily.    Smoking addiction  -     Discontinue: buPROPion XL (WELLBUTRIN XL) 150 MG 24 hr tablet; Take 1 tablet by mouth Daily.  -     buPROPion XL (WELLBUTRIN XL) 150 MG 24 hr tablet; Take 1 tablet by mouth Daily.    Skin lesion of back  -     Ambulatory Referral to Dermatology    Skin cancer screening  -     Ambulatory Referral to Dermatology    Encounter for screening mammogram for malignant neoplasm of breast  -     Mammo Screening Digital Tomosynthesis Bilateral With CAD; Future    Localized osteoporosis without current pathological fracture    Menopause  -     DEXA Bone Density Axial; Future    Vitamin D deficiency  -     Vitamin D 25 Hydroxy; Future    Healthcare maintenance  -     Comprehensive Metabolic Panel; Future  -     CBC Auto Differential; Future  -     Lipid Panel; Future  -     TSH Rfx On Abnormal To Free T4; Future    Colon cancer screening  -     Ambulatory Referral For Screening Colonoscopy    Encounter for hepatitis C screening test for low risk patient  -     Hepatitis C Antibody; Future    Impacted cerumen of left ear     Ear Cerumen Removal    Date/Time: 4/21/2021 9:15 AM  Performed by: Hollie Chapa APRN  Authorized by: Hollie Chapa APRN     Anesthesia:  Local Anesthetic: none  Location details: left ear  Patient tolerance:  "patient tolerated the procedure well with no immediate complications  Procedure type: irrigation   Sedation:  Patient sedated: no        Screening labs ordered to evaluate chronic conditions. I will contact patient regarding test results and provide instructions regarding any necessary changes in plan of care.  Nutrition and activity goals reviewed including: mainly water to drink, limit white flour/processed sugar, and processed foods, choose fresh fruits, vegetables, fish, lean meats,high fiber carbs, exercise  working toward 150 mins cardio per week, weight training 2x/week.  Risks/benefits and potential side effects of various smoking cessation treatment options reviewed with patient.  Smoking cessation support options also reviewed with patient.  \"Beat the Pack\" brochure provided and reviewed with patient.  Patient voiced understanding   A total of 3 minutes dedicated to smoking cessation counseling during this visit.  Continue wellbutrin for depression, anxiety and smoking cessation. Declines need for therapy  DEXA ordered to check for improvement in bone density with Forteo, patient will complete doses she has and stopAdequate calcium, vitamin D and weight bearing exercise such as walking are important to reduce the risk of osteoporosis.  Patient was encouraged to keep me informed of any acute changes, lack of improvement, or any new concerning symptoms.    Hollie Chapa, GIANNA   4/21/2021   09:12 EDT                "

## 2021-04-20 LAB
25(OH)D3 SERPL-MCNC: 33.2 NG/ML (ref 30–100)
HCV AB SER DONR QL: NORMAL

## 2021-04-21 DIAGNOSIS — Z12.11 SCREENING FOR COLON CANCER: Primary | ICD-10-CM

## 2021-04-21 PROCEDURE — 69209 REMOVE IMPACTED EAR WAX UNI: CPT | Performed by: NURSE PRACTITIONER

## 2021-04-21 RX ORDER — SODIUM, POTASSIUM,MAG SULFATES 17.5-3.13G
1 SOLUTION, RECONSTITUTED, ORAL ORAL TAKE AS DIRECTED
Qty: 354 ML | Refills: 0 | Status: SHIPPED | OUTPATIENT
Start: 2021-04-21 | End: 2021-08-24

## 2021-04-26 ENCOUNTER — APPOINTMENT (OUTPATIENT)
Dept: PREADMISSION TESTING | Facility: HOSPITAL | Age: 51
End: 2021-04-26

## 2021-05-03 ENCOUNTER — APPOINTMENT (OUTPATIENT)
Dept: PREADMISSION TESTING | Facility: HOSPITAL | Age: 51
End: 2021-05-03

## 2021-05-03 LAB — SARS-COV-2 RNA PNL SPEC NAA+PROBE: NOT DETECTED

## 2021-05-03 PROCEDURE — C9803 HOPD COVID-19 SPEC COLLECT: HCPCS

## 2021-05-03 PROCEDURE — U0004 COV-19 TEST NON-CDC HGH THRU: HCPCS

## 2021-05-05 ENCOUNTER — OUTSIDE FACILITY SERVICE (OUTPATIENT)
Dept: GASTROENTEROLOGY | Facility: CLINIC | Age: 51
End: 2021-05-05

## 2021-05-05 PROCEDURE — 45380 COLONOSCOPY AND BIOPSY: CPT | Performed by: INTERNAL MEDICINE

## 2021-05-05 PROCEDURE — 88305 TISSUE EXAM BY PATHOLOGIST: CPT | Performed by: INTERNAL MEDICINE

## 2021-05-05 PROCEDURE — 45385 COLONOSCOPY W/LESION REMOVAL: CPT | Performed by: INTERNAL MEDICINE

## 2021-05-06 ENCOUNTER — LAB REQUISITION (OUTPATIENT)
Dept: LAB | Facility: HOSPITAL | Age: 51
End: 2021-05-06

## 2021-05-06 DIAGNOSIS — D12.4 BENIGN NEOPLASM OF DESCENDING COLON: ICD-10-CM

## 2021-05-06 DIAGNOSIS — D12.5 BENIGN NEOPLASM OF SIGMOID COLON: ICD-10-CM

## 2021-05-06 DIAGNOSIS — Z86.010 PERSONAL HISTORY OF COLONIC POLYPS: ICD-10-CM

## 2021-05-06 DIAGNOSIS — D12.0 BENIGN NEOPLASM OF CECUM: ICD-10-CM

## 2021-05-06 DIAGNOSIS — Z12.11 ENCOUNTER FOR SCREENING FOR MALIGNANT NEOPLASM OF COLON: ICD-10-CM

## 2021-05-06 DIAGNOSIS — D12.2 BENIGN NEOPLASM OF ASCENDING COLON: ICD-10-CM

## 2021-05-06 DIAGNOSIS — K64.8 OTHER HEMORRHOIDS: ICD-10-CM

## 2021-05-07 ENCOUNTER — TELEPHONE (OUTPATIENT)
Dept: GASTROENTEROLOGY | Facility: CLINIC | Age: 51
End: 2021-05-07

## 2021-05-07 LAB
CYTO UR: NORMAL
LAB AP CASE REPORT: NORMAL
LAB AP CLINICAL INFORMATION: NORMAL
PATH REPORT.FINAL DX SPEC: NORMAL
PATH REPORT.GROSS SPEC: NORMAL

## 2021-05-07 NOTE — TELEPHONE ENCOUNTER
I spoke with Ms. Chau regarding the pathology.  She did have 4 adenoma type polyps.  The next examination would be in 3 years.  She is agreeable.

## 2021-05-14 ENCOUNTER — PRIOR AUTHORIZATION (OUTPATIENT)
Dept: FAMILY MEDICINE CLINIC | Facility: CLINIC | Age: 51
End: 2021-05-14

## 2021-05-14 ENCOUNTER — TELEPHONE (OUTPATIENT)
Dept: FAMILY MEDICINE CLINIC | Facility: CLINIC | Age: 51
End: 2021-05-14

## 2021-05-14 NOTE — TELEPHONE ENCOUNTER
Key: [id number]  PA started for Forteo   PENDING.        ALTERNATIVES  Tymlos  Prolia  Teriparatide   (All will require an additional PA)

## 2021-05-17 ENCOUNTER — TELEPHONE (OUTPATIENT)
Dept: FAMILY MEDICINE CLINIC | Facility: CLINIC | Age: 51
End: 2021-05-17

## 2021-05-17 NOTE — TELEPHONE ENCOUNTER
Pharmacy Name:  [facility]    Pharmacy representative phone number: [phone]    What medication are you calling in regards to: Forteo 600 MCG/2.4ML injection    What question does the pharmacy have: PHARMACY WOULD LIKE TO KNOW IF A PRIOR AUTHORIZATION HAS BEEN COMPLETED FOR THE MEDICATION     Who is the provider that prescribed the medication: AZIZA FOX

## 2021-05-17 NOTE — TELEPHONE ENCOUNTER
Attempted to contact, unable to connect to a representative. Transferred to the provider line and unable to speak further.

## 2021-05-18 NOTE — TELEPHONE ENCOUNTER
Advised pharmacist that PA has been completed 5/14, returned as a denial. We are currently waiting on DEXA scan before prescribing anything further    Pharmacy will keep Forteo Rx on hold until further notice.

## 2021-08-03 ENCOUNTER — APPOINTMENT (OUTPATIENT)
Dept: BONE DENSITY | Facility: HOSPITAL | Age: 51
End: 2021-08-03

## 2021-08-03 ENCOUNTER — APPOINTMENT (OUTPATIENT)
Dept: MAMMOGRAPHY | Facility: HOSPITAL | Age: 51
End: 2021-08-03

## 2021-08-06 ENCOUNTER — OFFICE VISIT (OUTPATIENT)
Dept: FAMILY MEDICINE CLINIC | Facility: CLINIC | Age: 51
End: 2021-08-06

## 2021-08-06 VITALS
OXYGEN SATURATION: 99 % | WEIGHT: 135 LBS | SYSTOLIC BLOOD PRESSURE: 126 MMHG | HEIGHT: 63 IN | DIASTOLIC BLOOD PRESSURE: 82 MMHG | BODY MASS INDEX: 23.92 KG/M2 | HEART RATE: 78 BPM

## 2021-08-06 DIAGNOSIS — C44.519 BASAL CELL CARCINOMA (BCC) OF UPPER BACK: ICD-10-CM

## 2021-08-06 DIAGNOSIS — F41.8 ANXIETY ABOUT HEALTH: ICD-10-CM

## 2021-08-06 DIAGNOSIS — C44.529 SQUAMOUS CELL CARCINOMA OF BACK: Primary | ICD-10-CM

## 2021-08-06 PROCEDURE — 99213 OFFICE O/P EST LOW 20 MIN: CPT | Performed by: NURSE PRACTITIONER

## 2021-08-06 NOTE — PROGRESS NOTES
"Subjective   Anthony Chau is a 51 y.o. female.   Chief Complaint   Patient presents with   • Basal Cell Carcinoma     Pt states she had a spot removed and there was another area that they missed. She tried to tell them at the office she went to but they didnt listen to her. Pt states she has a couple more spots that they need to get to but wouldnt listen to her. Pt states she is wanting another referral for somehwere else.       History of Present Illness   Patient is here with concerns about recent skin cancer diagnoses, had basal cell carcinoma removed from left back about 2 weeks ago; they also biopsied a spot next to that , was told it squamous cell; patient feels she did not receive an adequate skin check, has significant anxiety about the skin cancer and is afraid they missed something. She is requesting a referral to a different dermatologist.      The following portions of the patient's history were reviewed and updated as appropriate: allergies, current medications, past family history, past medical history, past social history, past surgical history and problem list.    Review of Systems   Skin: Positive for wound.   Psychiatric/Behavioral: The patient is nervous/anxious.        Objective   Physical Exam  Vitals reviewed.   Constitutional:       General: She is not in acute distress.     Appearance: Normal appearance. She is not ill-appearing.   HENT:      Head: Normocephalic and atraumatic.   Cardiovascular:      Rate and Rhythm: Normal rate.   Pulmonary:      Effort: Pulmonary effort is normal. No respiratory distress.      Breath sounds: Normal breath sounds.   Skin:     Findings: Lesion (punch biopsy site noted next to the CDI dressing of larger wound.) present.   Neurological:      Mental Status: She is alert and oriented to person, place, and time.   Psychiatric:         Mood and Affect: Mood is anxious.       /82   Pulse 78   Ht 161 cm (63.39\")   Wt 61.2 kg (135 lb)   SpO2 99%   BMI " 23.62 kg/m²     Assessment/Plan   Diagnoses and all orders for this visit:    1. Squamous cell carcinoma of back (Primary)    2. Basal cell carcinoma (BCC) of upper back    3. Anxiety about health      I encouraged patient to discuss her concerns with the dermatologist when she goes in for a follow up later today and explained they should complete the care for the sites they did procedures on so there is no delay in treatment.  If she wants a new referral after that, I can order that for her.  I tried to reassure and provided information about basal cell and sqamous cell skin cancers.   I also advised her to reschedule her mammogram and bone density scans that she had cancelled.  Patient was encouraged to keep me informed of any acute changes, lack of improvement, or any new concerning symptoms.

## 2021-08-06 NOTE — PATIENT INSTRUCTIONS
Squamous Cell Carcinoma  Squamous cell carcinoma is a common form of skin cancer. It begins in the squamous cells in the outer layer of the skin (epidermis). It occurs most often in parts of the body that are frequently exposed to the sun, such as the face, ears, lips, neck, arms, legs, and hands. However, this condition can occur anywhere on the body, including the inside of the mouth, the genital area, and the anus.  If squamous cell carcinoma is treated early, it rarely spreads to other areas of the body (metastasizes). If it is not treated, it can affect nearby tissues. In rare cases, it can spread to other areas of the body.  What are the causes?  This condition is usually caused by exposure to ultraviolet (UV) light. UV light may come from the sun or from tanning beds.   Other causes include exposure to:  · Arsenic.  · Radiation.  · Toxic tars and oils.  In very rare cases, a genetic condition that makes a person sensitive to sunlight (xeroderma pigmentosum) may cause the condition.  What increases the risk?  This condition is more likely to develop in people who:  · Are older than 50 years of age.  · Have fair skin (light complexion).  · Have blond or red hair.  · Have blue, green, or gray eyes.  · Have childhood freckling.  · Have had sun exposure over long periods of time, especially during childhood.  · Use tanning beds.  · Have had psoralen and ultraviolet A (PUVA) treatments.  · Have had repeated sunburns.  · Have a weakened immune system.  · Have an HPV (human papillomavirus) infection.  · Have a history of precancerous lesions (actinic keratosis).  · Have conditions that cause chronic scarring. These can include burn scars, chronic ulcers, heat (thermal) injuries, and radiation.  · Have been exposed to certain chemicals, such as tar, soot, and arsenic.  · Smoke.  What are the signs or symptoms?    This condition often starts as a red, pink, or brown growth on the skin. The growths have an irregular  surface that may feel rough. In some cases, the growths are easier to feel than to see. The growths may develop into a sore that does not heal.  How is this diagnosed?  This condition may be diagnosed with:  · A physical exam.  · Removal of a tissue sample to be examined under a microscope (biopsy).  How is this treated?  Treatment for this condition involves removing the cancerous tissue. The method that is used for this depends on the size and location of the tumor, as well as your overall health. Possible treatments include:  · Electrodesiccation and curettage. This involves alternately scraping and burning the tumor while using an electric current to control bleeding.  · Cryosurgery. This involves freezing the tumor with liquid nitrogen.  · Laser therapy. This uses an intense beam of light to remove the tumor.  · Photodynamic therapy. A chemical cream is applied to the skin, and light exposure is used to activate the chemical.  · High-energy rays that kill cancer cells (radiation therapy). This may be used for tumors that are deeper in the tissues.  · Surgical removal (excision) of the tumor. This involves removing the entire tumor and a small amount of normal skin that surrounds it.  · Mohs surgery. In this procedure, the cancerous skin cells are removed layer by layer until all of the tumor has been removed.  · Plastic surgery. The tumor is removed, and healthy skin from another part of the body is used to cover the wound (skin graft).  · Chemotherapy. This treatment uses medicines to destroy cancer cells.  · Chemotherapy creams or lotions. These may be applied directly to the skin where the tumor is located and may be used for smaller tumors.  · Targeted therapy. This targets specific parts of cancer cells and the area around them to block the growth and spread of the cancer.  · Immunotherapy. This treatment helps your body's immune system fight the cancer cells.  Follow these instructions at home:    · Avoid  being out in the sun. Wear protective clothing, including long-sleeved shirts, long pants, and a hat when outdoors.  · Do skin self-exams as told by your health care provider. Look for any new spots or changes in your skin.  · Do not use any products that contain nicotine or tobacco, such as cigarettes, e-cigarettes, and chewing tobacco. If you need help quitting, ask your health care provider.  · Keep all follow-up visits as told by your health care provider. This is important.  How is this prevented?    · Avoid the sun when it is at its strongest. This is usually between 10:00 a.m. and 4:00 p.m.  · When you are out in the sun, use a sunscreen that has a sun protection factor (SPF) of at least 30.  · Apply sunscreen at least 30 minutes before exposure to the sun.  · Reapply sunscreen every 2-4 hours while you are outside. Also reapply it after swimming and after excessive sweating.  · Always wear hats, protective clothing, and UV-blocking sunglasses when you are outdoors.  · Do not use tanning beds.  Contact a health care provider if:  · You notice any new growths or any changes in your skin.  · You have had a squamous cell carcinoma tumor removed and you notice a new growth in the same location.  Get help right away if you have:  · A fever or chills.  · Chest pain or difficulty breathing.  Summary  · Squamous cell carcinoma is a common form of skin cancer. It begins in the squamous cells in the outer layer of the skin (epidermis).  · This condition is usually caused by exposure to ultraviolet (UV) light.  · Treatment for this condition involves removing the cancerous tissue. The method that is used for this depends on the size and location of the tumor, as well as your overall health.  · Contact a health care provider if you notice any new growths or any changes in your skin.  · Keep all follow-up visits as told by your health care provider. This is important.  This information is not intended to replace advice  given to you by your health care provider. Make sure you discuss any questions you have with your health care provider.  Document Revised: 09/10/2019 Document Reviewed: 09/10/2019  [employer] Patient Education © 2021 [employer]    Preventing Skin Cancer, Adult  Skin cancer is the most common type of cancer. There are three main types. Squamous cell and basal cell skin cancer are the most common. Melanoma skin cancer is the most dangerous type.  Most skin cancers are caused by skin damage from exposure to ultraviolet (UV) light. UV light comes from the sun and from artificial tanning beds. Suntans and sunburns result from exposure to UV light.  Skin cancer occurs most often in older people, but it is usually the result of damage done earlier in life. The tans and sunburns you get at any age can lead to skin cancer in the future. To help prevent this, you can take steps to protect yourself.  What actions can I take to protect myself from skin cancer?  Many people like to get a tan, especially in the summer or when on vacation. However, tan or burned skin is a sign of skin damage. It increases your risk for skin cancer. To lower your risk:  Avoid exposure to UV light    · Try to stay out of the sun between 10 a.m. and 4 p.m. whenever possible. This is when the sun is at its strongest. Seek the shade during this time.  · Remember that you can also be exposed to UV rays on cloudy or hazy days. Sun exposure can be risky year-round, not just in the summer.  · Do not use a sunlamp, tanning bed, or tanning tarango to get a tan. If you really want a tan, use an artificial tanning lotion.  · Avoid getting sunburned. Sunburns are more common on bright svetlana days, especially when you are in areas where the sun is reflected off water or snow.  Use sunscreen and protective clothing    · Always use sunscreen--either a cream, lotion, or spray--when you are out in the sun. Keep sunscreen handy, such as in your gym bag or in your car,  so that you will have it when you need it.  · Use a sunscreen with a sun protection factor (SPF) of at least 15. Use an SPF of 30 or higher if you are in bright sun, especially when you are out in the snow or on the water.  · Make sure your sunscreen protects you from UVA and UVB light.  · Use an adequate amount of sunscreen to cover exposed areas of skin. Put it on 30 minutes before you go out. Reapply it every 2 hours or anytime you come out of the water.  · When you are out in the sun, wear a broad-brimmed hat and clothing that covers your arms and legs. Wear wraparound sunglasses.  Check your skin for changes  · Check your skin often from head to toe to look for any changes in the size, color, or shape of any moles or freckles. Check for any new moles or moles that bleed or become itchy. See your health care provider if you notice changes.  · Ask your health care provider about a total skin check. Ask if it should be part of your yearly physical or if you need to see a skin specialist (dermatologist).  Take other preventive measures    · Avoid exposure to harmful chemicals, such as arsenic.  ? Have your home's water tested for arsenic and other chemicals.  ? Take protective measures to avoid exposure to chemicals at work.  · Do not smoke any tobacco products, such as cigarettes, cigars, pipes, and e-cigarettes. If you need help quitting, ask your health care provider.  · Keep your immune system healthy.  ? Stay up to date on all vaccines, including the human papillomavirus (HPV) vaccine.  ? Eat at least 5 servings of fruits and vegetables every day.  Why are these changes important?  About 1 of every 5 people will get skin cancer. The best way to reduce your risk is to avoid skin damage from UV light. If you have teenagers in your house, they should know that just five bad sunburns as a teen could double their risk of skin cancer in the future. If you have younger children, always make sure to protect their skin  from the sun.  These changes can help reduce your risk of skin cancer, and they will also provide other health benefits, such as the following:  · Protecting your skin from the sun can help prevent painful sunburns, sun poisoning, and other skin damage and blemishes. This is especially important if:  ? You have pale white skin, freckles, and red hair.  ? You burn easily.  · Avoiding exposure to harmful chemicals can help prevent damage to other tissues in your body, such as your lungs, and prevent other types of cancer.  · Avoiding smoking tobacco can reduce your risk for other types of cancer and other health problems.  · Eating a healthy diet is good for your overall health.  What can happen if changes are not made?  If you do not make these changes, you will be at higher risk for skin cancer. If you develop skin cancer, the treatments could result in lost time from work and changes in your appearance from scars. The most dangerous type of skin cancer, melanoma, can be deadly if not found early.  Where to find support  For more support, talk to your primary health care provider or dermatologist.  Where to find more information  Learn more about skin cancer from:  · The Skin Cancer Foundation: www.skincancer.org/prevention  · The Centers for Disease Control and Prevention: www.cdc.gov/cancer/skin/  · The American Academy of Dermatology: www.aad.org  Summary  · Skin cancer is the most common type of cancer.  · Melanoma skin cancer can be deadly if not found early.  · Sunburns and tanning increase your risk for skin cancer.  · Protecting your skin from UV light is the best way to prevent skin cancer.  This information is not intended to replace advice given to you by your health care provider. Make sure you discuss any questions you have with your health care provider.  Document Revised: 04/10/2020 Document Reviewed: 02/11/2019  [employer] Patient Education © 2021 [employer]    Basal Cell Carcinoma  Basal cell  carcinoma is the most common form of skin cancer. It begins in the basal cells, which are at the bottom of the outer skin layer (epidermis). Basal cell carcinoma can almost always be cured. It rarely spreads to other areas of the body (metastasizes). It may come back at the same location (recur), but it can be treated again if this happens.  Basal cell carcinoma occurs most often on parts of the body that are frequently exposed to the sun, such as:  · Parts of the head, including the scalp or face.  · Ears.  · Neck.  · Arms or legs.  · Backs of the hands.  What are the causes?  This condition is usually caused by exposure to ultraviolet (UV) light. UV light may come from the sun or from tanning beds. Other causes include:  · Exposure to a highly poisonous metal (arsenic).  · Exposure to high-energy X-rays (radiation).  · Exposure to toxic tars and oils.  · Certain genetic conditions, such as a condition that makes a person sensitive to sunlight (xeroderma pigmentosum).  What increases the risk?  You are more likely to develop this condition if:  · You are older than 40 years of age.  · You have:  ? Fair skin (light complexion).  ? Blond or red hair.  ? Blue, green, or gray eyes.  ? Childhood freckling.  ? Had sun exposure over long periods of time, especially during childhood.  ? Had repeated sunburns.  ? A weakened immune system.  ? Been exposed to certain chemicals, such as tar, soot, and arsenic.  ? Chronic inflammatory conditions.  ? Chronic infections.  · You use tanning beds.  What are the signs or symptoms?  The main symptom of this condition is a growth or lesion on the skin.  · The shape and color of the growth or lesion may vary. The main types include:  ? An open sore that may remain open for 3 weeks or longer. The sore may bleed or crust. This type of lesion can be an early sign of basal cell carcinoma. Basal cell carcinoma often shows up as a sore that does not heal.  ? A reddish area that may crust,  itch, or cause discomfort. This may occur on areas that are exposed to the sun. These patches might be easier to feel than to see.  ? A shiny or clear bump that is red, white, or pink. In people who have dark hair, the bump is often tan, black, or brown. These bumps can look like moles.  ? A pink growth with a raised border. The growth will have a crusted and indented area in the center. Small blood vessels may appear on the surface of the growth as it gets bigger.  ? A scar-like area that looks like shiny, stretched skin. The area may be white, yellow, or waxy. It often has irregular borders. This may be a sign of more aggressive basal cell carcinoma.  How is this diagnosed?  This condition may be diagnosed with:  · A physical exam.  · Removal of a tissue sample to be examined under a microscope (biopsy).  How is this treated?  Treatment for this condition involves removing the cancerous tissue. The method that is used for this depends on the type, size, location, and number of tumors. Possible treatments include:  · Mohs surgery. In this procedure, the cancerous skin cells are removed layer by layer until all of the tumor has been removed.  · Surgical removal (excision) of the tumor. This involves removing the entire tumor and a small amount of normal skin that surrounds it.  · Cryosurgery. This involves freezing the tumor with liquid nitrogen.  · Plastic surgery. The tumor is removed, and healthy skin from another part of the body is used to cover the wound. This may be done for large tumors that are in areas where it is not possible to stretch the nearby skin to sew the edges of the wound together.  · Radiation. This may be used for tumors on the face.  · Photodynamic therapy. A chemical cream is applied to the skin, and light exposure is used to activate the chemical.  · Electrodesiccation and curettage. This involves alternately scraping and burning the tumor while using an electric current to control  bleeding.  · Chemical treatments, such as imiquimod cream and interferon injections. These may be used to remove superficial tumors with minimal scarring.  Follow these instructions at home:  · Avoid direct exposure to the sun.  · Do self-exams as told by your health care provider. Look for new spots or changes in your skin.  · Keep all follow-up visits as told by your health care provider. This is important.  How is this prevented?    · Avoid the sun when it is the strongest. This is usually between 10 a.m. and 4 p.m.  · When you are out in the sun, use a sunscreen that has a sun protection factor (SPF) of at least 30.  · Apply sunscreen at least 30 minutes before exposure to the sun.  · Reapply sunscreen every 2-4 hours while you are outside. Also reapply it after swimming and after excessive sweating.  · Always wear hats, protective clothing, and UV-blocking sunglasses when you are outdoors.  · Do not use tanning beds.  Contact a health care provider if you:  · Notice any new spots or any changes in your skin.  · Have had a basal cell carcinoma tumor removed, and you notice a new growth in the same location.  Get help right away if you have a spot that:  · Is sore and does not heal.  · Bleeds easily with minor injury.  Summary  · Basal cell carcinoma is the most common form of skin cancer. It begins in the bottom of the outer skin layer (epidermis). Basal cell carcinoma can almost always be cured.  · This condition is usually caused by exposure to ultraviolet (UV) light. It mostly affects the face, scalp, neck, ears, arms, legs, or backs of the hands.  · The main symptom of this condition is a growth or lesion on the skin that can vary in shape and color.  · You can prevent this cancer by avoiding direct exposure to the sun, applying sunscreen of at least 30 SPF, and wearing protective clothing.  · Apply sunscreen 30 minutes before you go out into the sun, and reapply every 2-4 hours while you are outside.  This  information is not intended to replace advice given to you by your health care provider. Make sure you discuss any questions you have with your health care provider.  Document Revised: 05/08/2019 Document Reviewed: 05/08/2019  [employer] Patient Education © 2021 [employer]      Reschedule bone density and mammogram

## 2021-08-24 ENCOUNTER — OFFICE VISIT (OUTPATIENT)
Dept: FAMILY MEDICINE CLINIC | Facility: CLINIC | Age: 51
End: 2021-08-24

## 2021-08-24 VITALS
BODY MASS INDEX: 24.03 KG/M2 | DIASTOLIC BLOOD PRESSURE: 80 MMHG | OXYGEN SATURATION: 98 % | SYSTOLIC BLOOD PRESSURE: 122 MMHG | HEART RATE: 70 BPM | WEIGHT: 135.6 LBS | HEIGHT: 63 IN

## 2021-08-24 DIAGNOSIS — G89.29 CHRONIC LOW BACK PAIN, UNSPECIFIED BACK PAIN LATERALITY, UNSPECIFIED WHETHER SCIATICA PRESENT: ICD-10-CM

## 2021-08-24 DIAGNOSIS — C44.90 SKIN CANCER: Primary | ICD-10-CM

## 2021-08-24 DIAGNOSIS — M54.50 CHRONIC LOW BACK PAIN, UNSPECIFIED BACK PAIN LATERALITY, UNSPECIFIED WHETHER SCIATICA PRESENT: ICD-10-CM

## 2021-08-24 DIAGNOSIS — R10.2 TENDERNESS OF FEMALE PELVIC ORGANS: ICD-10-CM

## 2021-08-24 DIAGNOSIS — Z23 COVID-19 VACCINE SERIES STARTED: ICD-10-CM

## 2021-08-24 PROCEDURE — 99214 OFFICE O/P EST MOD 30 MIN: CPT | Performed by: NURSE PRACTITIONER

## 2021-08-24 PROCEDURE — 91300 COVID-19 (PFIZER): CPT | Performed by: NURSE PRACTITIONER

## 2021-08-24 PROCEDURE — 0001A COVID-19 (PFIZER): CPT | Performed by: NURSE PRACTITIONER

## 2021-09-14 ENCOUNTER — IMMUNIZATION (OUTPATIENT)
Dept: FAMILY MEDICINE CLINIC | Facility: CLINIC | Age: 51
End: 2021-09-14

## 2021-09-14 DIAGNOSIS — Z23 IMMUNIZATION DUE: Primary | ICD-10-CM

## 2021-09-14 PROCEDURE — 91300 COVID-19 (PFIZER): CPT | Performed by: FAMILY MEDICINE

## 2021-09-14 PROCEDURE — 0001A COVID-19 (PFIZER): CPT | Performed by: FAMILY MEDICINE

## 2024-03-26 ENCOUNTER — OFFICE VISIT (OUTPATIENT)
Dept: FAMILY MEDICINE CLINIC | Facility: CLINIC | Age: 54
End: 2024-03-26
Payer: COMMERCIAL

## 2024-03-26 VITALS
BODY MASS INDEX: 19.66 KG/M2 | DIASTOLIC BLOOD PRESSURE: 90 MMHG | HEIGHT: 65 IN | OXYGEN SATURATION: 98 % | HEART RATE: 75 BPM | SYSTOLIC BLOOD PRESSURE: 128 MMHG | WEIGHT: 118 LBS

## 2024-03-26 DIAGNOSIS — Z86.018 HISTORY OF UTERINE FIBROID: ICD-10-CM

## 2024-03-26 DIAGNOSIS — Z12.31 ENCOUNTER FOR SCREENING MAMMOGRAM FOR MALIGNANT NEOPLASM OF BREAST: ICD-10-CM

## 2024-03-26 DIAGNOSIS — Z86.010 HISTORY OF ADENOMATOUS POLYP OF COLON: ICD-10-CM

## 2024-03-26 DIAGNOSIS — Z01.419 ENCOUNTER FOR ROUTINE GYNECOLOGICAL EXAMINATION WITH PAPANICOLAOU SMEAR OF CERVIX: ICD-10-CM

## 2024-03-26 DIAGNOSIS — R31.9 HEMATURIA, UNSPECIFIED TYPE: ICD-10-CM

## 2024-03-26 DIAGNOSIS — Z12.11 COLON CANCER SCREENING: ICD-10-CM

## 2024-03-26 DIAGNOSIS — R82.90 URINE FINDING: Primary | ICD-10-CM

## 2024-03-26 DIAGNOSIS — Z78.0 MENOPAUSE: ICD-10-CM

## 2024-03-26 LAB
BILIRUB BLD-MCNC: NEGATIVE MG/DL
CLARITY, POC: CLEAR
COLOR UR: YELLOW
EXPIRATION DATE: ABNORMAL
GLUCOSE UR STRIP-MCNC: NEGATIVE MG/DL
KETONES UR QL: NEGATIVE
LEUKOCYTE EST, POC: NEGATIVE
Lab: ABNORMAL
NITRITE UR-MCNC: NEGATIVE MG/ML
PH UR: 6 [PH] (ref 5–8)
PROT UR STRIP-MCNC: ABNORMAL MG/DL
RBC # UR STRIP: ABNORMAL /UL
SP GR UR: 1.03 (ref 1–1.03)
UROBILINOGEN UR QL: NORMAL

## 2024-03-26 PROCEDURE — 87086 URINE CULTURE/COLONY COUNT: CPT | Performed by: NURSE PRACTITIONER

## 2024-03-26 NOTE — PROGRESS NOTES
Subjective   Anthony Chau is a 53 y.o. female.   Chief Complaint   Patient presents with    Blood in Urine     Ate a beet salad Saturday didn't know if it was from that but claims to have some slight discomfort like she used to when it was time for her menstrual but hasn't had a period in 6 yrs    Rectal Bleeding     Ate a beet salad the other day and didn't know if it would be that but also has a lot of intestinal issues and h/o hemmrroids. No constipation or straining issues       History of Present Illness   Patient is here with complaint of possible blood in stool Sunday, ate beets on Saturday so unsure if this was beets or blood. When wiped on Sunday after urination  had blood in urine and stool. Has not seen any blood since Sunday.    The following portions of the patient's history were reviewed and updated as appropriate: allergies, current medications, past family history, past medical history, past social history, past surgical history, and problem list.    Review of Systems   Constitutional:  Negative for chills and fever.   Gastrointestinal:  Positive for blood in stool.   Genitourinary:  Positive for hematuria.       Objective   Physical Exam  Vitals reviewed.   Constitutional:       General: She is not in acute distress.     Appearance: Normal appearance. She is not ill-appearing, toxic-appearing or diaphoretic.   Cardiovascular:      Rate and Rhythm: Normal rate and regular rhythm.   Pulmonary:      Effort: Pulmonary effort is normal. No respiratory distress.      Breath sounds: Normal breath sounds.   Abdominal:      General: Bowel sounds are normal. There is no distension.      Palpations: Abdomen is soft.      Tenderness: There is no abdominal tenderness. There is no guarding or rebound.   Neurological:      Mental Status: She is alert and oriented to person, place, and time.   Psychiatric:         Thought Content: Thought content normal.         Judgment: Judgment normal.       /90    "Pulse 75   Ht 165.1 cm (65\")   Wt 53.5 kg (118 lb)   SpO2 98%   BMI 19.64 kg/m²     Assessment & Plan   Problems Addressed this Visit    None  Visit Diagnoses       Urine finding    -  Primary    Relevant Orders    POCT urinalysis dipstick, automated (Completed)    Colon cancer screening        Relevant Orders    Ambulatory Referral For Screening Colonoscopy    History of adenomatous polyp of colon        Relevant Orders    Ambulatory Referral For Screening Colonoscopy    History of uterine fibroid        Relevant Orders    Ambulatory Referral to Gynecology    Encounter for routine gynecological examination with Papanicolaou smear of cervix        Relevant Orders    Ambulatory Referral to Gynecology    Hematuria, unspecified type        Relevant Orders    Urine Culture - Urine, Urine, Clean Catch          Diagnoses         Codes Comments    Urine finding    -  Primary ICD-10-CM: R82.90  ICD-9-CM: 791.9     Colon cancer screening     ICD-10-CM: Z12.11  ICD-9-CM: V76.51     History of adenomatous polyp of colon     ICD-10-CM: Z86.010  ICD-9-CM: V12.72     History of uterine fibroid     ICD-10-CM: Z86.018  ICD-9-CM: V13.29     Encounter for routine gynecological examination with Papanicolaou smear of cervix     ICD-10-CM: Z01.419  ICD-9-CM: V72.31, V76.2     Hematuria, unspecified type     ICD-10-CM: R31.9  ICD-9-CM: 599.70             Colonoscopy report reviewed, showed hemorrhoids, 4 polyps, due for 3 year follow up  Referred to GYN for pap and evaluation of uterine fibroid; unclear if bleeding from vagina or other   Follow up in one week     Answers submitted by the patient for this visit:  Other (Submitted on 3/25/2024)  Please describe your symptoms.: possible blood in urine and stool back pain and abdominal discomfort  Have you had these symptoms before?: No  How long have you been having these symptoms?: 1-4 days  Primary Reason for Visit (Submitted on 3/25/2024)  What is the primary reason for your visit?: " Other

## 2024-03-27 ENCOUNTER — TELEPHONE (OUTPATIENT)
Dept: FAMILY MEDICINE CLINIC | Facility: CLINIC | Age: 54
End: 2024-03-27
Payer: COMMERCIAL

## 2024-03-27 DIAGNOSIS — R10.9 FLANK PAIN: ICD-10-CM

## 2024-03-27 DIAGNOSIS — R31.9 HEMATURIA, UNSPECIFIED TYPE: Primary | ICD-10-CM

## 2024-03-27 LAB — BACTERIA SPEC AEROBE CULT: NORMAL

## 2024-03-27 NOTE — TELEPHONE ENCOUNTER
"  Caller: Anthony Chau \"Merlene\"    Relationship: Self    Best call back number: [phone]    What is the best time to reach you: ANY TIME    Who are you requesting to speak with (clinical staff, provider,  specific staff member): AZIZA FOX    Do you know the name of the person who called: SELF    What was the call regarding: PATIENT WOKE UP THIS MORNING MORE UNCOMFORTABLE WITH MORE BACK PAIN. PATIENT WOULD LIKE TO KNOW IF SHE SHOULD BE ON ANTIBIOTIC JUST YET OR WAIT UNTIL CULTURE COMES BACK. PATIENT WAS CONCERNED THAT THERE WAS BLOOD IN URINE. PLEASE ADVISE      "

## 2024-03-27 NOTE — TELEPHONE ENCOUNTER
Let patient know her urine culture was negative, I have ordered an xray to check for kidney stones. I do not think she needs to schedule this, but can call ahead to be sure. Vishal Black

## 2024-04-02 ENCOUNTER — LAB (OUTPATIENT)
Dept: LAB | Facility: HOSPITAL | Age: 54
End: 2024-04-02
Payer: COMMERCIAL

## 2024-04-02 ENCOUNTER — OFFICE VISIT (OUTPATIENT)
Dept: FAMILY MEDICINE CLINIC | Facility: CLINIC | Age: 54
End: 2024-04-02
Payer: COMMERCIAL

## 2024-04-02 VITALS
WEIGHT: 120.2 LBS | SYSTOLIC BLOOD PRESSURE: 130 MMHG | OXYGEN SATURATION: 97 % | HEIGHT: 62 IN | BODY MASS INDEX: 22.12 KG/M2 | HEART RATE: 72 BPM | DIASTOLIC BLOOD PRESSURE: 82 MMHG

## 2024-04-02 DIAGNOSIS — E55.9 VITAMIN D DEFICIENCY: ICD-10-CM

## 2024-04-02 DIAGNOSIS — E78.5 DYSLIPIDEMIA: ICD-10-CM

## 2024-04-02 DIAGNOSIS — Z00.00 ANNUAL PHYSICAL EXAM: Primary | ICD-10-CM

## 2024-04-02 LAB
25(OH)D3 SERPL-MCNC: 43.3 NG/ML (ref 30–100)
ALBUMIN SERPL-MCNC: 4.6 G/DL (ref 3.5–5.2)
ALBUMIN/GLOB SERPL: 2 G/DL
ALP SERPL-CCNC: 74 U/L (ref 39–117)
ALT SERPL W P-5'-P-CCNC: 15 U/L (ref 1–33)
ANION GAP SERPL CALCULATED.3IONS-SCNC: 9 MMOL/L (ref 5–15)
AST SERPL-CCNC: 20 U/L (ref 1–32)
BASOPHILS # BLD AUTO: 0.05 10*3/MM3 (ref 0–0.2)
BASOPHILS NFR BLD AUTO: 0.5 % (ref 0–1.5)
BILIRUB SERPL-MCNC: 0.3 MG/DL (ref 0–1.2)
BILIRUB UR QL STRIP: NEGATIVE
BUN SERPL-MCNC: 10 MG/DL (ref 6–20)
BUN/CREAT SERPL: 8.3 (ref 7–25)
CALCIUM SPEC-SCNC: 9.3 MG/DL (ref 8.6–10.5)
CHLORIDE SERPL-SCNC: 104 MMOL/L (ref 98–107)
CHOLEST SERPL-MCNC: 264 MG/DL (ref 0–200)
CLARITY UR: CLEAR
CO2 SERPL-SCNC: 27 MMOL/L (ref 22–29)
COLOR UR: ABNORMAL
CREAT SERPL-MCNC: 1.2 MG/DL (ref 0.57–1)
DEPRECATED RDW RBC AUTO: 43 FL (ref 37–54)
EGFRCR SERPLBLD CKD-EPI 2021: 54.2 ML/MIN/1.73
EOSINOPHIL # BLD AUTO: 0.11 10*3/MM3 (ref 0–0.4)
EOSINOPHIL NFR BLD AUTO: 1.2 % (ref 0.3–6.2)
ERYTHROCYTE [DISTWIDTH] IN BLOOD BY AUTOMATED COUNT: 12.3 % (ref 12.3–15.4)
GLOBULIN UR ELPH-MCNC: 2.3 GM/DL
GLUCOSE SERPL-MCNC: 87 MG/DL (ref 65–99)
GLUCOSE UR STRIP-MCNC: NEGATIVE MG/DL
HCT VFR BLD AUTO: 45.6 % (ref 34–46.6)
HDLC SERPL-MCNC: 65 MG/DL (ref 40–60)
HGB BLD-MCNC: 15.7 G/DL (ref 12–15.9)
HGB UR QL STRIP.AUTO: NEGATIVE
HOLD SPECIMEN: NORMAL
IMM GRANULOCYTES # BLD AUTO: 0.02 10*3/MM3 (ref 0–0.05)
IMM GRANULOCYTES NFR BLD AUTO: 0.2 % (ref 0–0.5)
KETONES UR QL STRIP: NEGATIVE
LDLC SERPL CALC-MCNC: 169 MG/DL (ref 0–100)
LDLC/HDLC SERPL: 2.55 {RATIO}
LEUKOCYTE ESTERASE UR QL STRIP.AUTO: NEGATIVE
LYMPHOCYTES # BLD AUTO: 2.81 10*3/MM3 (ref 0.7–3.1)
LYMPHOCYTES NFR BLD AUTO: 30.8 % (ref 19.6–45.3)
MCH RBC QN AUTO: 32.8 PG (ref 26.6–33)
MCHC RBC AUTO-ENTMCNC: 34.4 G/DL (ref 31.5–35.7)
MCV RBC AUTO: 95.4 FL (ref 79–97)
MONOCYTES # BLD AUTO: 0.52 10*3/MM3 (ref 0.1–0.9)
MONOCYTES NFR BLD AUTO: 5.7 % (ref 5–12)
NEUTROPHILS NFR BLD AUTO: 5.61 10*3/MM3 (ref 1.7–7)
NEUTROPHILS NFR BLD AUTO: 61.6 % (ref 42.7–76)
NITRITE UR QL STRIP: NEGATIVE
NRBC BLD AUTO-RTO: 0 /100 WBC (ref 0–0.2)
PH UR STRIP.AUTO: 5.5 [PH] (ref 5–8)
PLATELET # BLD AUTO: 222 10*3/MM3 (ref 140–450)
PMV BLD AUTO: 11.5 FL (ref 6–12)
POTASSIUM SERPL-SCNC: 4.5 MMOL/L (ref 3.5–5.2)
PROT SERPL-MCNC: 6.9 G/DL (ref 6–8.5)
PROT UR QL STRIP: NEGATIVE
RBC # BLD AUTO: 4.78 10*6/MM3 (ref 3.77–5.28)
SODIUM SERPL-SCNC: 140 MMOL/L (ref 136–145)
SP GR UR STRIP: 1.02 (ref 1–1.03)
TRIGL SERPL-MCNC: 167 MG/DL (ref 0–150)
TSH SERPL DL<=0.05 MIU/L-ACNC: 2.49 UIU/ML (ref 0.27–4.2)
UROBILINOGEN UR QL STRIP: ABNORMAL
VLDLC SERPL-MCNC: 30 MG/DL (ref 5–40)
WBC NRBC COR # BLD AUTO: 9.12 10*3/MM3 (ref 3.4–10.8)

## 2024-04-02 PROCEDURE — 1160F RVW MEDS BY RX/DR IN RCRD: CPT | Performed by: NURSE PRACTITIONER

## 2024-04-02 PROCEDURE — 1159F MED LIST DOCD IN RCRD: CPT | Performed by: NURSE PRACTITIONER

## 2024-04-02 PROCEDURE — 82306 VITAMIN D 25 HYDROXY: CPT | Performed by: NURSE PRACTITIONER

## 2024-04-02 PROCEDURE — 81003 URINALYSIS AUTO W/O SCOPE: CPT | Performed by: NURSE PRACTITIONER

## 2024-04-02 PROCEDURE — 80061 LIPID PANEL: CPT | Performed by: NURSE PRACTITIONER

## 2024-04-02 PROCEDURE — 80050 GENERAL HEALTH PANEL: CPT | Performed by: NURSE PRACTITIONER

## 2024-04-02 PROCEDURE — 99396 PREV VISIT EST AGE 40-64: CPT | Performed by: NURSE PRACTITIONER

## 2024-04-02 NOTE — PROGRESS NOTES
Patient Care Team:  Hollie Chapa APRN as PCP - General (Nurse Practitioner)  Levon Robertson MD as Obstetrician (Obstetrics and Gynecology)  Alejandro Hernandez MD as Consulting Physician (Orthopedic Surgery)  Alex Guardado MD as Consulting Physician (Dermatology)  Cameron Perez MD as Consulting Physician (Gastroenterology)     Chief complaint: Patient is in today for a physical     Patient is a 53 y.o. female who presents for her yearly physical exam.     HPI   Patient is here for annual physical, was seen last week with concern for bleeding, unclear if rectal, vaginal or bladder. She was referred to GYN and for colonoscopy. Urine culture was negative for UTI  She was on Forteo for 2 years for osteoporosis, dexa is scheduled  Still smokes 2 or more cigarettes a day.  Review of Systems   Constitutional:  Positive for fatigue. Negative for chills, fever and unexpected weight change.   HENT:  Positive for tinnitus. Negative for congestion, ear pain, hearing loss and trouble swallowing.    Eyes:  Positive for visual disturbance (declining vision). Negative for photophobia, pain and redness.   Respiratory:  Negative for cough, chest tightness, shortness of breath and wheezing.    Cardiovascular:  Negative for chest pain, palpitations and leg swelling.   Gastrointestinal:  Positive for abdominal distention (occ bloating) and abdominal pain (occ). Negative for blood in stool (not this week, possibly one week ago; has hemorrhoids; scheduled for colonoscopy), constipation and diarrhea.   Endocrine: Positive for polydipsia (occ). Negative for polyuria.        Occ hot flashes   Genitourinary:  Positive for dysuria (occ) and vaginal bleeding (possible one time; scheduled with GYN). Negative for difficulty urinating.   Musculoskeletal:  Positive for arthralgias, back pain, myalgias (occ cramps) and neck pain (tension with anxiety).   Skin:  Negative for color change, pallor, rash and wound.   Neurological:  Positive  for numbness. Negative for dizziness, light-headedness and headaches.   Psychiatric/Behavioral:  Positive for dysphoric mood (occ). Negative for self-injury, sleep disturbance and suicidal ideas. The patient is nervous/anxious.       History  Past Medical History:   Diagnosis Date    Anxiety     Depression     H/O blood clots     History of low back pain     HPV (human papilloma virus) infection     Muscle weakness     Skin cancer     Uterine fibroid       Past Surgical History:   Procedure Laterality Date    DIAGNOSTIC LAPAROSCOPY      ENDOMETRIAL ABLATION  2006    HIP TROCHANTERIC NAILING WITH INTRAMEDULLARY HIP SCREW Right 7/4/2018    Procedure: HIP TROCANTERIC NAILING WITH INTRAMEDULLARY HIP SCREW;  Surgeon: Alejandro Hernandez MD;  Location: [facility], [facility];  Service: Orthopedics    MULTIPLE TOOTH EXTRACTIONS      TOTAL HIP ARTHROPLASTY Right     03/21/2019    TUBAL ABDOMINAL LIGATION        Allergies   Allergen Reactions    Betadine [Povidone Iodine] Swelling     Used in nostrils before surgery, OK on skin      Family History   Problem Relation Age of Onset    Osteoporosis Other     Arthritis Mother     Asthma Mother     Cancer Mother     COPD Mother     Hyperlipidemia Mother     Hypertension Mother     Kidney disease Mother     Miscarriages / Stillbirths Mother     Osteoporosis Mother     Cancer Father 74        bladder    Hearing loss Father     Arthritis Maternal Aunt     Osteoporosis Maternal Aunt     Cancer Paternal Aunt     Osteoporosis Paternal Aunt     Alcohol abuse Maternal Grandmother     Arthritis Maternal Grandmother     Diabetes Maternal Grandmother     Hyperlipidemia Maternal Grandmother     Hypertension Maternal Grandmother     Osteoporosis Maternal Grandmother     COPD Paternal Grandmother      Social History     Socioeconomic History    Marital status: Single   Tobacco Use    Smoking status: Some Days     Current packs/day: 0.50     Average packs/day: 0.5 packs/day for 30.0 years (15.0 ttl pk-yrs)      Types: Cigarettes     Passive exposure: Never    Smokeless tobacco: Never   Substance and Sexual Activity    Alcohol use: Yes     Comment: Occasional    Drug use: Yes     Types: Marijuana     Comment: occ    Sexual activity: Defer        Current Outpatient Medications:     Calcium Carbonate-Vitamin D (OSCAL-500) 500-400 MG-UNIT per tablet, Take 2 tablets by mouth Daily., Disp: , Rfl:     rosuvastatin (Crestor) 5 MG tablet, Take 1 tablet by mouth Every Night., Disp: 90 tablet, Rfl: 3    Immunizations   N/A   Prescribed/Refused   Date     Td/Tdap  (Booster Q 10 yrs)   []           Prescribed    []     Refused        []           Flu  (Yearly)   []        Prescribed    []     Refused        []           Pneumonia      []        Prescribed    []     Refused        []                 Hep B     []        Prescribed    []     Refused        []           Shingles  (Age 50 and older)   []        Prescribed    []     Refused        []           Immunization History   Administered Date(s) Administered    COVID-19 (PFIZER) Purple Cap Monovalent 08/24/2021, 09/14/2021    Flu Vaccine Quad PF >36MO 09/26/2019    Fluzone (or Fluarix & Flulaval for VFC) >6mos 09/26/2019     Health Maintenance   Topic Date Due    MAMMOGRAM  Never done    DXA SCAN  07/26/2020    COLORECTAL CANCER SCREENING  05/05/2024    PAP SMEAR  08/25/2024 (Originally 9/26/2022)    Pneumococcal Vaccine 0-64 (1 of 2 - PCV) 09/02/2024 (Originally 4/19/1976)    TDAP/TD VACCINES (1 - Tdap) 12/02/2024 (Originally 4/19/1989)    ZOSTER VACCINE (1 of 2) 12/02/2024 (Originally 4/19/2020)    COVID-19 Vaccine (3 - 2023-24 season) 12/04/2024 (Originally 9/1/2023)    INFLUENZA VACCINE  08/01/2024    ANNUAL PHYSICAL  04/02/2025    HEPATITIS C SCREENING  Completed        Diabetes  [] Yes  [x] No   N/A      Date     Eye Exam     []             []   Complete     []   Recommended Date:  Where:       Foot Exam     []         []   Complete          Obesity Counseling     []       []    "Complete     No results found for: \"HGBA1C\", \"MICROALBUR\"    Additional Testing      Date     Colorectal Screening       []   N/A   []   Complete    [x]   Ordered     Date:    Where:       Pap      []   N/A   []   Complete   [x]   OB/GYN Date:    Where:       Mammogram        []   N/A   []   Complete   []  OB/GYN   [x]   Ordered Date:    Where:     PSA  (Over age 50)    []   N/A   []   Complete   []   Ordered Date:    Where:     US Aorta  (For male smokers, age 65)     []   N/A   []   Complete   []   Ordered Date:    Where:     CT for Smoker  (Age 55-75, 30 pk yr)    []   N/A   []   Complete   []   Ordered Date:    Where:     Bone Density/DEXA      []   N/A   []   Complete   [x]   Ordered Date:    Follow-up:     Hep. C        []   N/A   [x]   Complete   []   Ordered Date:    Where:     Results for orders placed or performed in visit on 04/02/24   CBC Auto Differential    Specimen: Blood   Result Value Ref Range    WBC 9.12 3.40 - 10.80 10*3/mm3    RBC 4.78 3.77 - 5.28 10*6/mm3    Hemoglobin 15.7 12.0 - 15.9 g/dL    Hematocrit 45.6 34.0 - 46.6 %    MCV 95.4 79.0 - 97.0 fL    MCH 32.8 26.6 - 33.0 pg    MCHC 34.4 31.5 - 35.7 g/dL    RDW 12.3 12.3 - 15.4 %    RDW-SD 43.0 37.0 - 54.0 fl    MPV 11.5 6.0 - 12.0 fL    Platelets 222 140 - 450 10*3/mm3    Neutrophil % 61.6 42.7 - 76.0 %    Lymphocyte % 30.8 19.6 - 45.3 %    Monocyte % 5.7 5.0 - 12.0 %    Eosinophil % 1.2 0.3 - 6.2 %    Basophil % 0.5 0.0 - 1.5 %    Immature Grans % 0.2 0.0 - 0.5 %    Neutrophils, Absolute 5.61 1.70 - 7.00 10*3/mm3    Lymphocytes, Absolute 2.81 0.70 - 3.10 10*3/mm3    Monocytes, Absolute 0.52 0.10 - 0.90 10*3/mm3    Eosinophils, Absolute 0.11 0.00 - 0.40 10*3/mm3    Basophils, Absolute 0.05 0.00 - 0.20 10*3/mm3    Immature Grans, Absolute 0.02 0.00 - 0.05 10*3/mm3    nRBC 0.0 0.0 - 0.2 /100 WBC   Comprehensive Metabolic Panel    Specimen: Blood   Result Value Ref Range    Glucose 87 65 - 99 mg/dL    BUN 10 6 - 20 mg/dL    Creatinine 1.20 (H) " "0.57 - 1.00 mg/dL    Sodium 140 136 - 145 mmol/L    Potassium 4.5 3.5 - 5.2 mmol/L    Chloride 104 98 - 107 mmol/L    CO2 27.0 22.0 - 29.0 mmol/L    Calcium 9.3 8.6 - 10.5 mg/dL    Total Protein 6.9 6.0 - 8.5 g/dL    Albumin 4.6 3.5 - 5.2 g/dL    ALT (SGPT) 15 1 - 33 U/L    AST (SGOT) 20 1 - 32 U/L    Alkaline Phosphatase 74 39 - 117 U/L    Total Bilirubin 0.3 0.0 - 1.2 mg/dL    Globulin 2.3 gm/dL    A/G Ratio 2.0 g/dL    BUN/Creatinine Ratio 8.3 7.0 - 25.0    Anion Gap 9.0 5.0 - 15.0 mmol/L    eGFR 54.2 (L) >60.0 mL/min/1.73   Lipid Panel    Specimen: Blood   Result Value Ref Range    Total Cholesterol 264 (H) 0 - 200 mg/dL    Triglycerides 167 (H) 0 - 150 mg/dL    HDL Cholesterol 65 (H) 40 - 60 mg/dL    LDL Cholesterol  169 (H) 0 - 100 mg/dL    VLDL Cholesterol 30 5 - 40 mg/dL    LDL/HDL Ratio 2.55    Vitamin D,25-Hydroxy    Specimen: Blood   Result Value Ref Range    25 Hydroxy, Vitamin D 43.3 30.0 - 100.0 ng/ml   TSH Rfx On Abnormal To Free T4    Specimen: Blood   Result Value Ref Range    TSH 2.490 0.270 - 4.200 uIU/mL   Urinalysis With Culture If Indicated - Urine, Clean Catch    Specimen: Urine, Clean Catch   Result Value Ref Range    Color, UA Dark Yellow (A) Yellow, Straw    Appearance, UA Clear Clear    pH, UA 5.5 5.0 - 8.0    Specific Gravity, UA 1.025 1.005 - 1.030    Glucose, UA Negative Negative    Ketones, UA Negative Negative    Bilirubin, UA Negative Negative    Blood, UA Negative Negative    Protein, UA Negative Negative    Leuk Esterase, UA Negative Negative    Nitrite, UA Negative Negative    Urobilinogen, UA 0.2 E.U./dL 0.2 - 1.0 E.U./dL   [facility] Urine Culture Tube - Urine, Clean Catch    Specimen: Urine, Clean Catch   Result Value Ref Range    Extra Tube Hold for add-ons.             /82 (BP Location: Right arm, Patient Position: Sitting, Cuff Size: Adult)   Pulse 72   Ht 157.5 cm (62\")   Wt 54.5 kg (120 lb 3.2 oz)   SpO2 97%   BMI 21.98 kg/m²       Physical Exam  Vitals and nursing " note reviewed.   Constitutional:       General: She is not in acute distress.     Appearance: Normal appearance. She is well-developed. She is not diaphoretic.   HENT:      Head: Normocephalic and atraumatic.      Right Ear: External ear normal.      Left Ear: External ear normal.      Nose: Nose normal.   Eyes:      General: No scleral icterus.        Right eye: No discharge.         Left eye: No discharge.      Conjunctiva/sclera: Conjunctivae normal.      Pupils: Pupils are equal, round, and reactive to light.   Neck:      Thyroid: No thyromegaly.      Vascular: No JVD.      Trachea: No tracheal deviation.   Cardiovascular:      Rate and Rhythm: Normal rate and regular rhythm.      Heart sounds: Normal heart sounds. No murmur heard.     No friction rub. No gallop.   Pulmonary:      Effort: Pulmonary effort is normal. No respiratory distress.      Breath sounds: Normal breath sounds. No stridor. No wheezing or rales.   Chest:      Chest wall: No tenderness.   Breasts:     Right: Normal. No swelling, bleeding, inverted nipple, mass, nipple discharge, skin change or tenderness.      Left: Normal. No swelling, bleeding, inverted nipple, mass, nipple discharge, skin change or tenderness.      Comments: Fibrocystic areas bilateral  Abdominal:      General: Bowel sounds are normal. There is no distension.      Palpations: Abdomen is soft. There is no mass.      Tenderness: There is no abdominal tenderness. There is no guarding or rebound.      Hernia: No hernia is present.   Musculoskeletal:         General: No tenderness or deformity.      Cervical back: Normal range of motion and neck supple.      Right lower leg: No edema.      Left lower leg: No edema.   Lymphadenopathy:      Cervical: No cervical adenopathy.      Upper Body:      Right upper body: No supraclavicular, axillary or pectoral adenopathy.      Left upper body: No supraclavicular, axillary or pectoral adenopathy.   Skin:     General: Skin is warm and dry.       Coloration: Skin is not pale.      Findings: No erythema or rash.   Neurological:      Mental Status: She is alert and oriented to person, place, and time.      Cranial Nerves: No cranial nerve deficit.      Motor: No abnormal muscle tone.      Coordination: Coordination normal.      Deep Tendon Reflexes: Reflexes are normal and symmetric. Reflexes normal.   Psychiatric:         Mood and Affect: Mood is anxious.         Behavior: Behavior normal.         Thought Content: Thought content normal.         Judgment: Judgment normal.             Counseling provided on diet and nutrition and tobacco cessation.    Diagnoses and all orders for this visit:    Annual physical exam  -     CBC Auto Differential; Future  -     Comprehensive Metabolic Panel; Future  -     Lipid Panel; Future  -     TSH Rfx On Abnormal To Free T4; Future  -     Urinalysis With Culture If Indicated - Urine, Clean Catch; Future  -     CBC Auto Differential  -     Comprehensive Metabolic Panel  -     Lipid Panel  -     TSH Rfx On Abnormal To Free T4  -     Urinalysis With Culture If Indicated - Urine, Clean Catch  -     [facility] Urine Culture Tube - Urine, Clean Catch    Vitamin D deficiency  -     Vitamin D,25-Hydroxy; Future  -     Vitamin D,25-Hydroxy    Dyslipidemia  -     Comprehensive Metabolic Panel; Future  -     Lipid Panel; Future  -     rosuvastatin (Crestor) 5 MG tablet; Take 1 tablet by mouth Every Night.     Screening labs ordered to evaluate chronic conditions. I will contact patient regarding test results and provide instructions regarding any necessary changes in plan of care.  Nutrition and activity goals reviewed including: mainly water to drink, limit white flour/processed sugar, and processed foods, choose fresh fruits, vegetables, fish, lean meats,high fiber carbs, exercise  working toward 150 mins cardio per week, weight training 2x/week.  Encouraged to continue to work on smoking cessation, declines need for nicotine  replacement.    Hollie Chapa, GIANNA   4/10/2024   17:35 EDT          Please note that portions of this document were completed with a voice recognition program.     At [facility], we believe that sharing information builds trust and better relationships. You are receiving this note because you are receiving care at [facility] or have recently visited. It is possible you will see health information before a provider has talked with you about it. This kind of information can be easy to misunderstand. To help you fully understand what it means for your health, we urge you to discuss this note with your provider.

## 2024-04-03 RX ORDER — ROSUVASTATIN CALCIUM 5 MG/1
5 TABLET, COATED ORAL NIGHTLY
Qty: 90 TABLET | Refills: 3 | Status: SHIPPED | OUTPATIENT
Start: 2024-04-03

## 2024-04-19 ENCOUNTER — HOSPITAL ENCOUNTER (OUTPATIENT)
Dept: BONE DENSITY | Facility: HOSPITAL | Age: 54
Discharge: HOME | End: 2024-04-19
Payer: COMMERCIAL

## 2024-04-19 DIAGNOSIS — Z78.0 MENOPAUSE: ICD-10-CM

## 2024-04-19 PROCEDURE — 77080 DXA BONE DENSITY AXIAL: CPT

## 2024-04-26 ENCOUNTER — APPOINTMENT (OUTPATIENT)
Dept: OTHER | Facility: HOSPITAL | Age: 54
End: 2024-04-26
Payer: COMMERCIAL

## 2024-04-26 ENCOUNTER — HOSPITAL ENCOUNTER (OUTPATIENT)
Dept: MAMMOGRAPHY | Facility: HOSPITAL | Age: 54
Discharge: HOME | End: 2024-04-26
Payer: COMMERCIAL

## 2024-04-26 DIAGNOSIS — Z12.31 ENCOUNTER FOR SCREENING MAMMOGRAM FOR MALIGNANT NEOPLASM OF BREAST: ICD-10-CM

## 2024-04-26 PROCEDURE — 77067 SCR MAMMO BI INCL CAD: CPT

## 2024-04-26 PROCEDURE — 77063 BREAST TOMOSYNTHESIS BI: CPT

## 2024-05-06 ENCOUNTER — LAB REQUISITION (OUTPATIENT)
Dept: LAB | Facility: HOSPITAL | Age: 54
End: 2024-05-06
Payer: COMMERCIAL

## 2024-05-06 ENCOUNTER — OUTSIDE FACILITY SERVICE (OUTPATIENT)
Dept: GASTROENTEROLOGY | Facility: CLINIC | Age: 54
End: 2024-05-06
Payer: COMMERCIAL

## 2024-05-06 DIAGNOSIS — K64.8 OTHER HEMORRHOIDS: ICD-10-CM

## 2024-05-06 DIAGNOSIS — Z12.11 ENCOUNTER FOR SCREENING FOR MALIGNANT NEOPLASM OF COLON: ICD-10-CM

## 2024-05-06 DIAGNOSIS — Z86.010 PERSONAL HISTORY OF COLONIC POLYPS: ICD-10-CM

## 2024-05-06 DIAGNOSIS — D12.5 BENIGN NEOPLASM OF SIGMOID COLON: ICD-10-CM

## 2024-05-06 DIAGNOSIS — K57.30 DIVERTICULOSIS OF LARGE INTESTINE WITHOUT PERFORATION OR ABSCESS WITHOUT BLEEDING: ICD-10-CM

## 2024-05-06 PROCEDURE — 45385 COLONOSCOPY W/LESION REMOVAL: CPT | Performed by: INTERNAL MEDICINE

## 2024-05-06 PROCEDURE — 88305 TISSUE EXAM BY PATHOLOGIST: CPT | Performed by: INTERNAL MEDICINE

## 2024-05-08 ENCOUNTER — TELEPHONE (OUTPATIENT)
Dept: GASTROENTEROLOGY | Facility: CLINIC | Age: 54
End: 2024-05-08
Payer: COMMERCIAL

## 2024-09-20 ENCOUNTER — LAB (OUTPATIENT)
Dept: LAB | Facility: HOSPITAL | Age: 54
End: 2024-09-20
Payer: COMMERCIAL

## 2024-09-20 ENCOUNTER — OFFICE VISIT (OUTPATIENT)
Dept: FAMILY MEDICINE CLINIC | Facility: CLINIC | Age: 54
End: 2024-09-20
Payer: COMMERCIAL

## 2024-09-20 ENCOUNTER — HOSPITAL ENCOUNTER (OUTPATIENT)
Dept: GENERAL RADIOLOGY | Facility: HOSPITAL | Age: 54
Discharge: HOME | End: 2024-09-20
Payer: COMMERCIAL

## 2024-09-20 VITALS
DIASTOLIC BLOOD PRESSURE: 81 MMHG | HEIGHT: 62 IN | WEIGHT: 109.4 LBS | SYSTOLIC BLOOD PRESSURE: 132 MMHG | HEART RATE: 98 BPM | BODY MASS INDEX: 20.13 KG/M2 | OXYGEN SATURATION: 99 %

## 2024-09-20 DIAGNOSIS — E78.00 PURE HYPERCHOLESTEROLEMIA: Primary | ICD-10-CM

## 2024-09-20 DIAGNOSIS — G56.23 ULNAR NEUROPATHY OF BOTH UPPER EXTREMITIES: ICD-10-CM

## 2024-09-20 DIAGNOSIS — E78.5 DYSLIPIDEMIA: ICD-10-CM

## 2024-09-20 LAB
ALBUMIN SERPL-MCNC: 4.5 G/DL (ref 3.5–5.2)
ALBUMIN/GLOB SERPL: 1.6 G/DL
ALP SERPL-CCNC: 66 U/L (ref 39–117)
ALT SERPL W P-5'-P-CCNC: 16 U/L (ref 1–33)
ANION GAP SERPL CALCULATED.3IONS-SCNC: 10 MMOL/L (ref 5–15)
AST SERPL-CCNC: 35 U/L (ref 1–32)
BILIRUB SERPL-MCNC: 0.5 MG/DL (ref 0–1.2)
BUN SERPL-MCNC: 11 MG/DL (ref 6–20)
BUN/CREAT SERPL: 10.7 (ref 7–25)
CALCIUM SPEC-SCNC: 9.6 MG/DL (ref 8.6–10.5)
CHLORIDE SERPL-SCNC: 100 MMOL/L (ref 98–107)
CHOLEST SERPL-MCNC: 316 MG/DL (ref 0–200)
CO2 SERPL-SCNC: 29 MMOL/L (ref 22–29)
CREAT SERPL-MCNC: 1.03 MG/DL (ref 0.57–1)
EGFRCR SERPLBLD CKD-EPI 2021: 64.7 ML/MIN/1.73
GLOBULIN UR ELPH-MCNC: 2.8 GM/DL
GLUCOSE SERPL-MCNC: 88 MG/DL (ref 65–99)
HDLC SERPL-MCNC: 89 MG/DL (ref 40–60)
LDLC SERPL CALC-MCNC: 200 MG/DL (ref 0–100)
LDLC/HDLC SERPL: 2.21 {RATIO}
POTASSIUM SERPL-SCNC: 5 MMOL/L (ref 3.5–5.2)
PROT SERPL-MCNC: 7.3 G/DL (ref 6–8.5)
SODIUM SERPL-SCNC: 139 MMOL/L (ref 136–145)
TRIGL SERPL-MCNC: 150 MG/DL (ref 0–150)
VLDLC SERPL-MCNC: 27 MG/DL (ref 5–40)

## 2024-09-20 PROCEDURE — 1160F RVW MEDS BY RX/DR IN RCRD: CPT | Performed by: FAMILY MEDICINE

## 2024-09-20 PROCEDURE — 1159F MED LIST DOCD IN RCRD: CPT | Performed by: FAMILY MEDICINE

## 2024-09-20 PROCEDURE — 72040 X-RAY EXAM NECK SPINE 2-3 VW: CPT

## 2024-09-20 PROCEDURE — 1126F AMNT PAIN NOTED NONE PRSNT: CPT | Performed by: FAMILY MEDICINE

## 2024-09-20 PROCEDURE — 99214 OFFICE O/P EST MOD 30 MIN: CPT | Performed by: FAMILY MEDICINE

## 2024-09-23 ENCOUNTER — LAB (OUTPATIENT)
Dept: LAB | Facility: HOSPITAL | Age: 54
End: 2024-09-23
Payer: COMMERCIAL

## 2024-09-23 ENCOUNTER — TELEPHONE (OUTPATIENT)
Dept: FAMILY MEDICINE CLINIC | Facility: CLINIC | Age: 54
End: 2024-09-23
Payer: COMMERCIAL

## 2024-09-23 DIAGNOSIS — G56.23 ULNAR NEUROPATHY OF BOTH UPPER EXTREMITIES: ICD-10-CM

## 2024-09-23 LAB — VIT B12 BLD-MCNC: 408 PG/ML (ref 211–946)

## 2024-09-23 PROCEDURE — 82607 VITAMIN B-12: CPT

## 2024-10-01 DIAGNOSIS — G56.23 ULNAR NEUROPATHY OF BOTH UPPER EXTREMITIES: Primary | ICD-10-CM

## 2024-10-01 DIAGNOSIS — M54.2 CERVICALGIA: ICD-10-CM

## 2024-10-10 ENCOUNTER — TELEPHONE (OUTPATIENT)
Dept: FAMILY MEDICINE CLINIC | Facility: CLINIC | Age: 54
End: 2024-10-10
Payer: COMMERCIAL

## 2024-10-10 DIAGNOSIS — M54.2 CERVICALGIA: Primary | ICD-10-CM

## 2024-10-10 NOTE — TELEPHONE ENCOUNTER
Spoke to patient per provider to let her know her CT order was not approved, they needed 6 weeks of PT or nerve conduction study results. She approved having physical therapy orders put in and said she would work to have her test moved up sooner.

## 2024-10-31 ENCOUNTER — HOSPITAL ENCOUNTER (OUTPATIENT)
Dept: NUTRITION | Facility: HOSPITAL | Age: 54
Setting detail: RECURRING SERIES
Discharge: HOME | End: 2024-10-31
Payer: COMMERCIAL

## 2024-10-31 DIAGNOSIS — E78.5 DYSLIPIDEMIA: Primary | ICD-10-CM

## 2024-10-31 PROCEDURE — 97802 MEDICAL NUTRITION INDIV IN: CPT

## 2024-10-31 NOTE — CONSULTS
[facility]          Initial 60 Minute Nutrition Visit    Date: 10/31/2024   Patient Name: Anthony Chau  : 1970   MRN: [id number]   Referring Provider: Hattie Branham, *    Reason for Visit: HLD, also has concern for difficulty gaining weight  Visit Format: In person    Nutrition Assessment       Social History:   Social History     Socioeconomic History    Marital status: Single   Tobacco Use    Smoking status: Some Days     Current packs/day: 0.50     Average packs/day: 0.5 packs/day for 30.0 years (15.0 ttl pk-yrs)     Types: Cigarettes     Passive exposure: Never    Smokeless tobacco: Never   Vaping Use    Vaping status: Never Used   Substance and Sexual Activity    Alcohol use: Yes     Alcohol/week: 1.0 - 2.0 standard drink of alcohol     Types: 1 - 2 Drinks containing 0.5 oz of alcohol per week     Comment: Occasional    Drug use: Yes     Types: Marijuana     Comment: occ    Sexual activity: Not Currently     Partners: Male     Active Problem List:   Patient Active Problem List    Diagnosis     Pure hypercholesterolemia [E78.00]     Ulnar neuropathy of both upper extremities [G56.23]     Skin cancer [C44.90]     Anxiety [F41.9]     Depression [F32.A]     History of low back pain [Z87.39]     Uterine fibroid [D25.9]     Muscle weakness [M62.81]     HPV (human papilloma virus) infection [B97.7]     H/O blood clots [Z86.718]     Closed fracture of neck of right femur [S72.001A]     Tobacco abuse [Z72.0]     Marijuana use [F12.90]     Leukocytosis [D72.829]     Well woman exam with routine gynecological exam [Z01.419]       Current Medications:   Current Outpatient Medications:     Calcium Carbonate-Vitamin D (OSCAL-500) 500-400 MG-UNIT per tablet, Take 2 tablets by mouth Daily., Disp: , Rfl:     rosuvastatin (Crestor) 5 MG tablet, Take 1 tablet by mouth Every Night., Disp: 90 tablet, Rfl: 3    Sodium Sulfate-Mag Sulfate-KCl (SUTAB) 0651-683-092 MG tablet, Take 24  "tablets by mouth Take As Directed., Disp: 24 tablet, Rfl: 0    Recent Pertinent Labs: Chol 316, HDL 89,     Hunger Vital Sign Food Insecurity Assessment:  Within the past 12 months I/we worried whether our food would run out before I/we got money to buy more: No   Within the past 12 months the food I/we bought just didn't last and I/we didn't have money to get more: No   Use of food assistance programs (WIC, food stamps, food obrien) Yes - Patient has SNAP benefits       Food & Nutrition Related History       Food Allergies: None noted  Food Intolerances: None noted  Food Behavior: Patient usually eats 2x/day  Nutrition Impact Symptoms: None  Gastrointestinal conditions that impact intake or food choices: IBS  Details at home: Lives with and provides care for her mother, currently unemployed, considering filing for disability  Who prepares most meals: patient  Who does grocery shopping: patient  How many meals are purchased from fast food/sit down restaurants per week: 2-3  Difficulty chewin - Normal  Difficulty swallowin - Normal  Diet requirement related to personal preference or cultural belief: None  History of eating disorder/disordered eating habits: None  Language/communication details: English  Barriers to learning: None    24 Hour Recall: See pre-appt questionnaire for details  Additional comments: Patient's dietary recall was not specific. She has been making some healthier choices but later in the appt, she would mention other foods that she eats occasionally.    Anthropometrics      Height:   Ht Readings from Last 1 Encounters:   24 157.5 cm (62.01\")     Weight:   Wt Readings from Last 3 Encounters:   24 49.6 kg (109 lb 6.4 oz)   24 54.5 kg (120 lb 3.2 oz)   24 53.5 kg (118 lb)     BMI: There is no height or weight on file to calculate BMI.   Weight Change: Patient reports current weight is ~115#. She wants to gain weight back to 125-130# (UBW)     Physical Activity "     Barriers to physical activity: Osteopenia, Nerve pain     Physical activity comments: Patient is interested in a referral for exercise education. She walks regularly and is currently in PT    Estimated Needs     Estimated Energy Needs: 9954-6173 kcal (MSJ x 1.375 + 300)    Estimated Protein Needs: 45-55g (0.8-1.0)     Estimated Fluid Needs: At least 64 oz/day     Discussion / Education      Anthony Chau is a 54 y.o. female who has been referred for HLD but her primary concern is her difficulty in gaining weight. Her primary motivation is to improve confidence, reduce risk of chronic disease/long term complications, improve energy levels, reduce need for medications, and improve symptoms. Her primary barriers to change are budgeting, time management, convenience, and lack of nutrition knowledge. She lives with family. Cooking and grocery shopping are done by patient. She dines out 2-3 times per week. She has not previously seen a dietitian/nutritionist.     RD reviewed guidelines for hyperlipidemia including reducing saturated fat intake from food and increasing fiber intake. Patient reports that she previously weighed between 125 and 130 lbs in February of this year but that by June of this year she was down to 110 lbs. She would like to gain weight back to her usual body weight. She complains that her clothes don't fit right, and she is concerned about muscle mass loss in her arms especially. She is currently receiving food stamps and has some difficulty in affording every type of food that she would like but she has managed to make compromises and find ways of making healthier choices with the budget that she has available. She recently left a unhealthy relationship and has been able to take more control over her dietary choices. She is eating more fish, trims the fat off her food, trying to eat more fruits and vegetables, and eating less red meat. RD reviewed guidelines for increased calories for weight  gain and adequate protein to prevent further muscle mass loss. Patient also expressed interest in exercise education referral. Patient feels that she does not eat a large amount of saturated or trans fats. RD suggested keeping a food and symptom log for three to seven days and sending it to RD via e-mail in between appointments for feedback and possible improvements to help with cholesterol levels and weight gain. Patient was agreeable.    RD and patient worked to set several goals for patient. RD provided contact info and encouraged patient to reach out with any additional questions or concerns following the appointment.    Assessment of patient engagement: Engaged    Measurement of understanding: Patient verbalized understanding, Patient able to demonstrate understanding with teach back     Resources Provided:  High fiber foods list  Frozen Favorites  Protein Supplements Packet    Goal (s)      Goal 1: Keep food and symptom log to share with RD prior to next appt.   Goal 2: 0.5-1# weight gain per week.    Plan of Care     PES Statement:   Inadequate oral intake related to skipping meals and not eating balanced meals as evidenced by dietary recall and interview.     Follow Up Visit      Follow Up scheduled for 12/4 @ 9:30 am    Total of 60 minutes spent with patient on nutrition counseling. Education based on Academy of Nutrition and Dietetics guidelines. Patient was provided with RD's contact information. Thank you for this referral.

## 2024-12-20 ENCOUNTER — OFFICE VISIT (OUTPATIENT)
Dept: FAMILY MEDICINE CLINIC | Facility: CLINIC | Age: 54
End: 2024-12-20
Payer: COMMERCIAL

## 2024-12-20 ENCOUNTER — LAB (OUTPATIENT)
Dept: LAB | Facility: HOSPITAL | Age: 54
End: 2024-12-20
Payer: COMMERCIAL

## 2024-12-20 VITALS
HEIGHT: 62 IN | OXYGEN SATURATION: 100 % | WEIGHT: 112 LBS | DIASTOLIC BLOOD PRESSURE: 84 MMHG | SYSTOLIC BLOOD PRESSURE: 132 MMHG | BODY MASS INDEX: 20.61 KG/M2 | HEART RATE: 76 BPM

## 2024-12-20 DIAGNOSIS — E78.00 PURE HYPERCHOLESTEROLEMIA: Primary | ICD-10-CM

## 2024-12-20 DIAGNOSIS — M62.81 MUSCLE WEAKNESS: ICD-10-CM

## 2024-12-20 DIAGNOSIS — E55.9 VITAMIN D DEFICIENCY: ICD-10-CM

## 2024-12-20 DIAGNOSIS — E78.00 PURE HYPERCHOLESTEROLEMIA: ICD-10-CM

## 2024-12-20 LAB
25(OH)D3 SERPL-MCNC: 62 NG/ML (ref 30–100)
ALBUMIN SERPL-MCNC: 4.5 G/DL (ref 3.5–5.2)
ALBUMIN/GLOB SERPL: 1.7 G/DL
ALP SERPL-CCNC: 63 U/L (ref 39–117)
ALT SERPL W P-5'-P-CCNC: 20 U/L (ref 1–33)
ANION GAP SERPL CALCULATED.3IONS-SCNC: 9 MMOL/L (ref 5–15)
AST SERPL-CCNC: 37 U/L (ref 1–32)
BILIRUB SERPL-MCNC: 0.3 MG/DL (ref 0–1.2)
BUN SERPL-MCNC: 14 MG/DL (ref 6–20)
BUN/CREAT SERPL: 14.6 (ref 7–25)
CALCIUM SPEC-SCNC: 9.4 MG/DL (ref 8.6–10.5)
CHLORIDE SERPL-SCNC: 102 MMOL/L (ref 98–107)
CHOLEST SERPL-MCNC: 297 MG/DL (ref 0–200)
CK SERPL-CCNC: 156 U/L (ref 20–180)
CO2 SERPL-SCNC: 28 MMOL/L (ref 22–29)
CREAT SERPL-MCNC: 0.96 MG/DL (ref 0.57–1)
DEPRECATED RDW RBC AUTO: 43.8 FL (ref 37–54)
EGFRCR SERPLBLD CKD-EPI 2021: 70.5 ML/MIN/1.73
ERYTHROCYTE [DISTWIDTH] IN BLOOD BY AUTOMATED COUNT: 12 % (ref 12.3–15.4)
GLOBULIN UR ELPH-MCNC: 2.6 GM/DL
GLUCOSE SERPL-MCNC: 81 MG/DL (ref 65–99)
HCT VFR BLD AUTO: 46.4 % (ref 34–46.6)
HDLC SERPL-MCNC: 104 MG/DL (ref 40–60)
HGB BLD-MCNC: 15.4 G/DL (ref 12–15.9)
LDH SERPL-CCNC: 242 U/L (ref 135–214)
LDLC SERPL CALC-MCNC: 178 MG/DL (ref 0–100)
LDLC/HDLC SERPL: 1.68 {RATIO}
MCH RBC QN AUTO: 32.6 PG (ref 26.6–33)
MCHC RBC AUTO-ENTMCNC: 33.2 G/DL (ref 31.5–35.7)
MCV RBC AUTO: 98.1 FL (ref 79–97)
PLATELET # BLD AUTO: 237 10*3/MM3 (ref 140–450)
PMV BLD AUTO: 10.1 FL (ref 6–12)
POTASSIUM SERPL-SCNC: 5 MMOL/L (ref 3.5–5.2)
PROT SERPL-MCNC: 7.1 G/DL (ref 6–8.5)
RBC # BLD AUTO: 4.73 10*6/MM3 (ref 3.77–5.28)
SODIUM SERPL-SCNC: 139 MMOL/L (ref 136–145)
TRIGL SERPL-MCNC: 94 MG/DL (ref 0–150)
TSH SERPL DL<=0.05 MIU/L-ACNC: 0.9 UIU/ML (ref 0.27–4.2)
VIT B12 BLD-MCNC: 609 PG/ML (ref 211–946)
VLDLC SERPL-MCNC: 15 MG/DL (ref 5–40)
WBC NRBC COR # BLD AUTO: 9.04 10*3/MM3 (ref 3.4–10.8)

## 2024-12-20 PROCEDURE — 82550 ASSAY OF CK (CPK): CPT

## 2024-12-20 PROCEDURE — 1160F RVW MEDS BY RX/DR IN RCRD: CPT | Performed by: FAMILY MEDICINE

## 2024-12-20 PROCEDURE — 83615 LACTATE (LD) (LDH) ENZYME: CPT

## 2024-12-20 PROCEDURE — 82607 VITAMIN B-12: CPT

## 2024-12-20 PROCEDURE — 36415 COLL VENOUS BLD VENIPUNCTURE: CPT

## 2024-12-20 PROCEDURE — 82306 VITAMIN D 25 HYDROXY: CPT

## 2024-12-20 PROCEDURE — 80050 GENERAL HEALTH PANEL: CPT

## 2024-12-20 PROCEDURE — 99214 OFFICE O/P EST MOD 30 MIN: CPT | Performed by: FAMILY MEDICINE

## 2024-12-20 PROCEDURE — 1126F AMNT PAIN NOTED NONE PRSNT: CPT | Performed by: FAMILY MEDICINE

## 2024-12-20 PROCEDURE — 1159F MED LIST DOCD IN RCRD: CPT | Performed by: FAMILY MEDICINE

## 2024-12-20 PROCEDURE — 80061 LIPID PANEL: CPT

## 2024-12-20 NOTE — PROGRESS NOTES
Office Note     Name: Anthony Chau    : 1970     MRN: [id number]     Chief Complaint  Hyperlipidemia (3 mo f/u for your cholesterol (has not been to lab yet but has fasted) /Discuss getting nerve conduction tests )    Subjective     History of Present Illness:  Anthony hCau is a 54 y.o. female who presents today for FU.  Patient is originally from [place].  She current lives at home with her mom and her 2 dogs.  She is currently not working.    Patient was found on recent blood work to have hyperlipidemia.  She is currently taking Crestor 5 mg, but notes she misses her nighttime dose frequently..  She is interested in repeating her fast lipid panel today.  She is also working on dietary modification.  She does notice that she will have intermittent muscle pains and weakness but notes that this started before starting the Crestor.    Patient notes that her bilateral pinky numbness is improving.  She continues to have cervicalgia and recent completed an 8-week course of physical therapy.  She is scheduled for a nerve conduction study in January but may cancel this as her symptoms have improved.    Review of Systems:   Review of Systems   Musculoskeletal:  Positive for neck pain.   All other systems reviewed and are negative.      Past Medical History:   Past Medical History:   Diagnosis Date    Anxiety     Colon polyp     Depression     H/O blood clots     History of low back pain     HPV (human papilloma virus) infection     Irritable bowel syndrome     Low back pain     Muscle weakness     Osteopenia     Skin cancer     Urinary tract infection     Uterine fibroid     Visual impairment        Past Surgical History:   Past Surgical History:   Procedure Laterality Date    COLONOSCOPY      DIAGNOSTIC LAPAROSCOPY      ENDOMETRIAL ABLATION  2006    FRACTURE SURGERY      HIP TROCHANTERIC NAILING WITH INTRAMEDULLARY HIP SCREW Right 2018    Procedure: HIP TROCANTERIC NAILING WITH INTRAMEDULLARY HIP  SCREW;  Surgeon: Alejandro Hernandez MD;  Location: [facility];  Service: Orthopedics    JOINT REPLACEMENT      MULTIPLE TOOTH EXTRACTIONS      TOTAL HIP ARTHROPLASTY Right     03/21/2019    TUBAL ABDOMINAL LIGATION         Family History:   Family History   Problem Relation Age of Onset    Arthritis Mother     Asthma Mother     Cancer Mother     COPD Mother     Hyperlipidemia Mother     Hypertension Mother     Kidney disease Mother     Miscarriages / Stillbirths Mother     Osteoporosis Mother     Diabetes Mother     Hearing loss Mother     Cancer Father 74        bladder    Hearing loss Father     Alcohol abuse Maternal Grandmother     Arthritis Maternal Grandmother     Diabetes Maternal Grandmother     Hyperlipidemia Maternal Grandmother     Hypertension Maternal Grandmother     Osteoporosis Maternal Grandmother     COPD Paternal Grandmother     Alcohol abuse Paternal Grandmother     Breast cancer Maternal Aunt     Arthritis Maternal Aunt     Osteoporosis Maternal Aunt     Cancer Paternal Aunt     Osteoporosis Paternal Aunt     Anxiety disorder Paternal Aunt     Osteoporosis Other     Ovarian cancer Neg Hx        Social History:   Social History     Socioeconomic History    Marital status: Single   Tobacco Use    Smoking status: Some Days     Current packs/day: 0.50     Average packs/day: 0.5 packs/day for 30.0 years (15.0 ttl pk-yrs)     Types: Cigarettes     Passive exposure: Never    Smokeless tobacco: Never   Vaping Use    Vaping status: Never Used   Substance and Sexual Activity    Alcohol use: Yes     Alcohol/week: 1.0 - 2.0 standard drink of alcohol     Types: 1 - 2 Drinks containing 0.5 oz of alcohol per week     Comment: Occasional    Drug use: Yes     Types: Marijuana     Comment: occ    Sexual activity: Not Currently     Partners: Male       Immunizations:   Immunization History   Administered Date(s) Administered    COVID-19 (PFIZER) Purple Cap Monovalent 08/24/2021, 09/14/2021    Flu Vaccine Quad PF  ">36MO 09/26/2019    Fluzone (or Fluarix & Flulaval for VFC) >6mos 09/26/2019        Medications:     Current Outpatient Medications:     Calcium Carbonate-Vitamin D (OSCAL-500) 500-400 MG-UNIT per tablet, Take 2 tablets by mouth Daily., Disp: , Rfl:     rosuvastatin (Crestor) 5 MG tablet, Take 1 tablet by mouth Every Night., Disp: 90 tablet, Rfl: 3    Allergies:   Allergies   Allergen Reactions    Betadine [Povidone Iodine] Swelling     Used in nostrils before surgery, OK on skin       Objective     Vital Signs  /84   Pulse 76   Ht 157.5 cm (62.01\")   Wt 50.8 kg (112 lb)   SpO2 100%   BMI 20.48 kg/m²   Estimated body mass index is 20.48 kg/m² as calculated from the following:    Height as of this encounter: 157.5 cm (62.01\").    Weight as of this encounter: 50.8 kg (112 lb).    BMI is within normal parameters. No other follow-up for BMI required.       Physical Exam  Vitals and nursing note reviewed.   Constitutional:       Appearance: Normal appearance. She is normal weight.   HENT:      Head: Normocephalic and atraumatic.      Nose: Nose normal.      Mouth/Throat:      Mouth: Mucous membranes are moist.   Eyes:      Extraocular Movements: Extraocular movements intact.      Pupils: Pupils are equal, round, and reactive to light.   Cardiovascular:      Rate and Rhythm: Normal rate.   Pulmonary:      Effort: Pulmonary effort is normal.   Abdominal:      General: Abdomen is flat.   Musculoskeletal:         General: Normal range of motion.      Cervical back: Normal range of motion.   Skin:     General: Skin is warm.   Neurological:      General: No focal deficit present.      Mental Status: She is alert and oriented to person, place, and time.   Psychiatric:         Mood and Affect: Mood normal.         Behavior: Behavior normal.         Thought Content: Thought content normal.         Judgment: Judgment normal.            Procedures     Results:  No results found for this or any previous visit (from the " past 24 hours).     Assessment and Plan     Assessment/Plan:  Diagnoses and all orders for this visit:    1. Pure hypercholesterolemia (Primary)  Assessment & Plan:   Lipid abnormalities are stable    Plan:  Continue same medication/s without change.      Discussed medication dosage, use, side effects, and goals of treatment in detail.    Counseled patient on lifestyle modifications to help control hyperlipidemia.   Cholesterol lowering dietary information shared with patient.  Advised patient to exercise for 150 minutes weekly. (30 minute brisk walk, 5 days a week for example)    Patient Treatment Goals:   LDL goal is under 100    Followup in 6 months.    Orders:  -     CBC (No Diff); Future  -     Comprehensive Metabolic Panel; Future  -     Lipid Panel; Future  -     TSH; Future    2. Muscle weakness  Assessment & Plan:  Check laboratory evaluation today    Orders:  -     CK; Future  -     Lactate Dehydrogenase; Future  -     Vitamin B12; Future    3. Vitamin D deficiency  -     Vitamin D,25-Hydroxy; Future        Follow Up  Return in about 4 months (around 4/7/2025) for Annual physical.    Hattie Branham DO   [facility]

## 2024-12-20 NOTE — ASSESSMENT & PLAN NOTE
Lipid abnormalities are stable    Plan:  Continue same medication/s without change.      Discussed medication dosage, use, side effects, and goals of treatment in detail.    Counseled patient on lifestyle modifications to help control hyperlipidemia.   Cholesterol lowering dietary information shared with patient.  Advised patient to exercise for 150 minutes weekly. (30 minute brisk walk, 5 days a week for example)    Patient Treatment Goals:   LDL goal is under 100    Followup in 6 months.

## 2025-04-03 ENCOUNTER — OFFICE VISIT (OUTPATIENT)
Dept: FAMILY MEDICINE CLINIC | Facility: CLINIC | Age: 55
End: 2025-04-03
Payer: COMMERCIAL

## 2025-04-03 ENCOUNTER — LAB (OUTPATIENT)
Dept: LAB | Facility: HOSPITAL | Age: 55
End: 2025-04-03
Payer: COMMERCIAL

## 2025-04-03 VITALS
OXYGEN SATURATION: 100 % | HEIGHT: 62 IN | DIASTOLIC BLOOD PRESSURE: 80 MMHG | SYSTOLIC BLOOD PRESSURE: 130 MMHG | HEART RATE: 76 BPM | WEIGHT: 115.2 LBS | BODY MASS INDEX: 21.2 KG/M2

## 2025-04-03 DIAGNOSIS — Z00.00 ANNUAL PHYSICAL EXAM: Primary | ICD-10-CM

## 2025-04-03 DIAGNOSIS — L98.9 SKIN LESION OF BACK: ICD-10-CM

## 2025-04-03 DIAGNOSIS — Z12.31 ENCOUNTER FOR SCREENING MAMMOGRAM FOR MALIGNANT NEOPLASM OF BREAST: ICD-10-CM

## 2025-04-03 DIAGNOSIS — M62.81 MUSCLE WEAKNESS: ICD-10-CM

## 2025-04-03 DIAGNOSIS — E78.00 PURE HYPERCHOLESTEROLEMIA: ICD-10-CM

## 2025-04-03 PROCEDURE — 85025 COMPLETE CBC W/AUTO DIFF WBC: CPT

## 2025-04-03 PROCEDURE — 80053 COMPREHEN METABOLIC PANEL: CPT

## 2025-04-03 PROCEDURE — 86140 C-REACTIVE PROTEIN: CPT

## 2025-04-03 PROCEDURE — 36415 COLL VENOUS BLD VENIPUNCTURE: CPT

## 2025-04-03 PROCEDURE — 83615 LACTATE (LD) (LDH) ENZYME: CPT

## 2025-04-03 PROCEDURE — 80061 LIPID PANEL: CPT

## 2025-04-03 NOTE — ASSESSMENT & PLAN NOTE
Lipid abnormalities are stable    Plan:  Continue same medication/s without change.      Discussed medication dosage, use, side effects, and goals of treatment in detail.    Counseled patient on lifestyle modifications to help control hyperlipidemia.   Cholesterol lowering dietary information shared with patient.  Advised patient to exercise for 150 minutes weekly. (30 minute brisk walk, 5 days a week for example)    Patient Treatment Goals:   LDL goal is under 100    Followup at the next regular appointment.

## 2025-04-03 NOTE — ASSESSMENT & PLAN NOTE
History of both basal and squamous cell, likely AK versus squamous cell carcinoma on back.  Refer to dermatology for eval and likely excision

## 2025-04-03 NOTE — PROGRESS NOTES
Female Physical Note      Date: 2025   Patient Name: Anthony Chau  : 1970   MRN: [id number]     Chief Complaint:    Chief Complaint   Patient presents with    Annual Exam     yearly    Skin Problem     Pt states she has a something on the back of her right shoulder that has been there for awhile        History of Present Illness: Anthony Chau is a 54 y.o. female who is here today for their annual health maintenance and physical. Patient is originally from [place].  She current lives at home with her mom and her 2 dogs.  She is currently not working.     Patient was found on recent blood work to have hyperlipidemia.  She is currently taking Crestor 5 mg, but notes she misses her nighttime dose frequently..  She is interested in repeating her fast lipid panel today.  She is also working on dietary modification.  She does notice that she will have intermittent muscle pains and weakness but notes that this started before starting the Crestor.  Previously found to have a mildly elevated LDH, due for repeat today.    Notes that she had a history of skin cancer on her back.  She has had a history of both basal and squamous cell carcinoma.  She does note a strong family history of skin cancers.  She has noticed over the past few months she has a nonhealing skin lesion located on her right mid back along her bra line and 1 above that as well.  Has a lesion located on her right upper thigh.    Is scheduled to follow-up with gynecology for Pap smear later this year.      Subjective      Review of Systems:   Review of Systems   Skin:  Positive for skin lesions.   All other systems reviewed and are negative.      Past Medical History, Social History, Family History and Care Team were all reviewed with patient and updated as appropriate.     Medications:     Current Outpatient Medications:     Calcium Carbonate-Vitamin D (OSCAL-500) 500-400 MG-UNIT per tablet, Take 2 tablets by mouth Daily., Disp: ,  "Rfl:     rosuvastatin (Crestor) 5 MG tablet, Take 1 tablet by mouth Every Night., Disp: 90 tablet, Rfl: 3    Allergies:   Allergies   Allergen Reactions    Betadine [Povidone Iodine] Swelling     Used in nostrils before surgery, OK on skin       Immunizations:  Td/Tdap(Booster Q 10 yrs): Given today    Colorectal Screening:     Last Completed Colonoscopy            Upcoming       COLORECTAL CANCER SCREENING (COLONOSCOPY - Every 3 Years) Next due on 5/6/2027 05/06/2024  Colonoscopy, Scan    05/05/2021  SCANNED - COLONOSCOPY    10/13/2016  COLONOSCOPY                           Pap:    Last Completed Pap Smear    This patient has no relevant Health Maintenance data.        Mammogram:    Last Completed Mammogram    This patient has no relevant Health Maintenance data.              PHQ-2 Depression Screening  Little interest or pleasure in doing things? Not at all   Feeling down, depressed, or hopeless? Not at all   PHQ-2 Total Score 0       Objective     Physical Exam:  Vital Signs:   Vitals:    04/03/25 1047 04/03/25 1109   BP: 162/91 130/80   Pulse: 76    SpO2: 100%    Weight: 52.3 kg (115 lb 3.2 oz)    Height: 157.5 cm (62.01\")      BMI is within normal parameters. No other follow-up for BMI required.       Physical Exam  Vitals and nursing note reviewed.   Constitutional:       Appearance: Normal appearance. She is normal weight.   HENT:      Head: Normocephalic and atraumatic.      Nose: Nose normal.      Mouth/Throat:      Mouth: Mucous membranes are moist.   Eyes:      Extraocular Movements: Extraocular movements intact.      Pupils: Pupils are equal, round, and reactive to light.   Cardiovascular:      Rate and Rhythm: Normal rate.   Pulmonary:      Effort: Pulmonary effort is normal.   Abdominal:      General: Abdomen is flat.   Musculoskeletal:         General: Normal range of motion.      Cervical back: Normal range of motion.   Skin:     General: Skin is warm.             Comments: Multiple skin " "lesions on right back, scaled and scabbed over.  Possible AK versus squamous cell carcinoma   Neurological:      General: No focal deficit present.      Mental Status: She is alert and oriented to person, place, and time.   Psychiatric:         Mood and Affect: Mood normal.         Behavior: Behavior normal.         Thought Content: Thought content normal.         Judgment: Judgment normal.         Procedures    Assessment / Plan      Assessment/Plan:   Diagnoses and all orders for this visit:    1. Annual physical exam (Primary)  Overview:  SCREENING TESTS    Year 2012 2013 2014 2015 2016 2017 2018 2019 2020 2021 2022 2023 2024 2025 2026 2027 2028 2029 2030 2031   Age                       PAP                       HPV high risk                       MANDI [Birads]                       JOSELITO score                       Colonoscopy                       DEXA [T-score]  Frax [hip/any]                       Lipids  [LDL / HDL / TG]                       Vitamin D                       Ovarian Screen                         Enter the month test was performed.  If month not known, enter \"X'  Black numbers = normal results  Red numbers = abnormal results  Black X = patient reported normal  Red X - patient reported abnormal      Referred by:    Profession:    Other info:          Orders:  -     Tdap Vaccine => 6yo IM (BOOSTRIX/ADACEL)    2. Pure hypercholesterolemia  Assessment & Plan:   Lipid abnormalities are stable    Plan:  Continue same medication/s without change.      Discussed medication dosage, use, side effects, and goals of treatment in detail.    Counseled patient on lifestyle modifications to help control hyperlipidemia.   Cholesterol lowering dietary information shared with patient.  Advised patient to exercise for 150 minutes weekly. (30 minute brisk walk, 5 days a week for example)    Patient Treatment Goals:   LDL goal is under 100    Followup at the next regular appointment.    Orders:  -     Cancel: CBC " (No Diff); Future  -     Lipid Panel; Future  -     Comprehensive Metabolic Panel; Future  -     CBC Auto Differential; Future    3. Muscle weakness  Assessment & Plan:  Recheck LDH today    Orders:  -     Lactate Dehydrogenase; Future    4. Encounter for screening mammogram for malignant neoplasm of breast  -     Mammo Screening Digital Tomosynthesis Bilateral With CAD; Future    5. Skin lesion of back  Assessment & Plan:  History of both basal and squamous cell, likely AK versus squamous cell carcinoma on back.  Refer to dermatology for eval and likely excision    Orders:  -     Ambulatory Referral to Dermatology        Follow Up:   Return in about 1 year (around 4/3/2026) for Annual physical.    Healthcare Maintenance:   Counseling provided on preventative screening, immunizations, diet and exercise recommendation.   Anthony Chau voices understanding and acceptance of this advice and will call back with any further questions or concerns. AVS with preventive healthcare tips printed for patient.     Hattie Branham DO   [facility]

## 2025-04-03 NOTE — LETTER
[facility]  Vaccine Consent Form    Patient Name:  Anthony Chau  Patient :  1970     Vaccine(s) Ordered    Tdap Vaccine => 6yo IM (BOOSTRIX/ADACEL)        Screening Checklist  The following questions should be completed prior to vaccination. If you answer “yes” to any question, it does not necessarily mean you should not be vaccinated. It just means we may need to clarify or ask more questions. If a question is unclear, please ask your healthcare provider to explain it.    Yes No   Any fever or moderate to severe illness today (mild illness and/or antibiotic treatment are not contraindications)?     Do you have a history of a serious reaction to any previous vaccinations, such as anaphylaxis, encephalopathy within 7 days, Guillain-Magnolia syndrome within 6 weeks, seizure?     Have you received any live vaccine(s) (e.g MMR, ADONIS) or any other vaccines in the last month (to ensure duplicate doses aren't given)?     Do you have an anaphylactic allergy to latex (DTaP, DTaP-IPV, Hep A, Hep B, MenB, RV, Td, Tdap), baker’s yeast (Hep B, HPV), polysorbates (RSV, nirsevimab, PCV 20, Rotavirrus, Tdap, Shingrix), or gelatin (ADONIS, MMR)?     Do you have an anaphylactic allergy to neomycin (Rabies, ADONIS, MMR, IPV, Hep A), polymyxin B (IPV), or streptomycin (IPV)?      Any cancer, leukemia, AIDS, or other immune system disorder? (ADONIS, MMR, RV)     Do you have a parent, brother, or sister with an immune system problem (if immune competence of vaccine recipient clinically verified, can proceed)? (MMR, ADONIS)     Any recent steroid treatments for >2 weeks, chemotherapy, or radiation treatment? (ADONIS, MMR)     Have you received antibody-containing blood transfusions or IVIG in the past 11 months (recommended interval is dependent on product)? (MMR, ADONIS)     Have you taken antiviral drugs (acyclovir, famciclovir, valacyclovir for ADONIS) in the last 24 or 48 hours, respectively?      Are you pregnant or planning to become pregnant  "within 1 month? (ADONIS, MMR, HPV, IPV, MenB, Abrexvy; For Hep B- refer to Engerix-B; For RSV - Abrysvo is indicated for 32-36 weeks of pregnancy from September to January)     For infants, have you ever been told your child has had intussusception or a medical emergency involving obstruction of the intestine (Rotavirus)? If not for an infant, can skip this question.         *Ordering Physicians/APC should be consulted if \"yes\" is checked by the patient or guardian above.  I have received, read, and understand the Vaccine Information Statement (VIS) for each vaccine ordered.  I have considered my or my child's health status as well as the health status of my close contacts.  I have taken the opportunity to discuss my vaccine questions with my or my child's health care provider.   I have requested that the ordered vaccine(s) be given to me or my child.  I understand the benefits and risks of the vaccines.  I understand that I should remain in the clinic for 15 minutes after receiving the vaccine(s).  _________________________________________________________  Signature of Patient or Parent/Legal Guardian ____________________  Date   "

## 2025-04-04 DIAGNOSIS — M62.81 MUSCLE WEAKNESS: Primary | ICD-10-CM

## 2025-04-04 LAB
ALBUMIN SERPL-MCNC: 4.6 G/DL (ref 3.5–5.2)
ALBUMIN/GLOB SERPL: 1.6 G/DL
ALP SERPL-CCNC: 81 U/L (ref 39–117)
ALT SERPL W P-5'-P-CCNC: 20 U/L (ref 1–33)
ANION GAP SERPL CALCULATED.3IONS-SCNC: 18.3 MMOL/L (ref 5–15)
AST SERPL-CCNC: 32 U/L (ref 1–32)
BASOPHILS # BLD AUTO: 0.05 10*3/MM3 (ref 0–0.2)
BASOPHILS NFR BLD AUTO: 0.4 % (ref 0–1.5)
BILIRUB SERPL-MCNC: 0.4 MG/DL (ref 0–1.2)
BUN SERPL-MCNC: 14 MG/DL (ref 6–20)
BUN/CREAT SERPL: 15.2 (ref 7–25)
CALCIUM SPEC-SCNC: 9.2 MG/DL (ref 8.6–10.5)
CHLORIDE SERPL-SCNC: 100 MMOL/L (ref 98–107)
CHOLEST SERPL-MCNC: 333 MG/DL (ref 0–200)
CO2 SERPL-SCNC: 23.7 MMOL/L (ref 22–29)
CREAT SERPL-MCNC: 0.92 MG/DL (ref 0.57–1)
CRP SERPL-MCNC: <0.3 MG/DL (ref 0–0.5)
DEPRECATED RDW RBC AUTO: 44.5 FL (ref 37–54)
EGFRCR SERPLBLD CKD-EPI 2021: 74.1 ML/MIN/1.73
EOSINOPHIL # BLD AUTO: 0.12 10*3/MM3 (ref 0–0.4)
EOSINOPHIL NFR BLD AUTO: 1 % (ref 0.3–6.2)
ERYTHROCYTE [DISTWIDTH] IN BLOOD BY AUTOMATED COUNT: 12.4 % (ref 12.3–15.4)
GLOBULIN UR ELPH-MCNC: 2.8 GM/DL
GLUCOSE SERPL-MCNC: 75 MG/DL (ref 65–99)
HCT VFR BLD AUTO: 47.1 % (ref 34–46.6)
HDLC SERPL-MCNC: 117 MG/DL (ref 40–60)
HGB BLD-MCNC: 15.9 G/DL (ref 12–15.9)
IMM GRANULOCYTES # BLD AUTO: 0.04 10*3/MM3 (ref 0–0.05)
IMM GRANULOCYTES NFR BLD AUTO: 0.3 % (ref 0–0.5)
LDH SERPL-CCNC: 265 U/L (ref 135–214)
LDLC SERPL CALC-MCNC: 202 MG/DL (ref 0–100)
LDLC/HDLC SERPL: 1.69 {RATIO}
LYMPHOCYTES # BLD AUTO: 2.67 10*3/MM3 (ref 0.7–3.1)
LYMPHOCYTES NFR BLD AUTO: 22 % (ref 19.6–45.3)
MCH RBC QN AUTO: 33.2 PG (ref 26.6–33)
MCHC RBC AUTO-ENTMCNC: 33.8 G/DL (ref 31.5–35.7)
MCV RBC AUTO: 98.3 FL (ref 79–97)
MONOCYTES # BLD AUTO: 0.58 10*3/MM3 (ref 0.1–0.9)
MONOCYTES NFR BLD AUTO: 4.8 % (ref 5–12)
NEUTROPHILS NFR BLD AUTO: 71.5 % (ref 42.7–76)
NEUTROPHILS NFR BLD AUTO: 8.69 10*3/MM3 (ref 1.7–7)
NRBC BLD AUTO-RTO: 0 /100 WBC (ref 0–0.2)
PLATELET # BLD AUTO: 259 10*3/MM3 (ref 140–450)
PMV BLD AUTO: 9.6 FL (ref 6–12)
POTASSIUM SERPL-SCNC: 4.5 MMOL/L (ref 3.5–5.2)
PROT SERPL-MCNC: 7.4 G/DL (ref 6–8.5)
RBC # BLD AUTO: 4.79 10*6/MM3 (ref 3.77–5.28)
SODIUM SERPL-SCNC: 142 MMOL/L (ref 136–145)
TRIGL SERPL-MCNC: 90 MG/DL (ref 0–150)
VLDLC SERPL-MCNC: 14 MG/DL (ref 5–40)
WBC NRBC COR # BLD AUTO: 12.15 10*3/MM3 (ref 3.4–10.8)

## 2025-04-30 NOTE — PROGRESS NOTES
Subjective     Chief Complaint   Patient presents with    Gynecologic Exam     New patient routine annual        Anthony Chau is a 55 y.o. year old  presenting to be seen for her annual exam.      She is not sexually active.  In the past 12 months there have been new sexual partners.  Condoms are not typically used.  She should like to be screened for STD's at today's exam.     She exercises regularly: yes.  She wears her seat belt: yes.  She has concerns about domestic violence: no.  She has noticed changes in height: not asked    GYN screening history:  Last pap: she reports her last PAP was normal  Last mammogram: she reports her last mammogram was normal  Last fasting lipid profile: hypercholesterolemia   Last colonoscopy: benign polyps  Last DEXA: osteopenia.    Health Maintenance for Postmenopausal Women  Menopause is a normal process in which your ability to get pregnant comes to an end. This process happens slowly over many months or years, usually between the ages of 48 and 55. Menopause is complete when you have missed your menstrual period for 12 months.  It is important to talk with your health care provider about some of the most common conditions that affect women after menopause (postmenopausal women). These include heart disease, cancer, and bone loss (osteoporosis). Adopting a healthy lifestyle and getting preventive care can help to promote your health and wellness. The actions you take can also lower your chances of developing some of these common conditions.  What are the signs and symptoms of menopause?  During menopause, you may have the following symptoms:  Hot flashes. These can be moderate or severe.  Night sweats.  Decrease in sex drive.  Mood swings.  Headaches.  Tiredness (fatigue).  Irritability.  Memory problems.  Problems falling asleep or staying asleep.  Talk with your health care provider about treatment options for your symptoms.  Do I need hormone replacement  therapy?  Hormone replacement therapy is effective in treating symptoms that are caused by menopause, such as hot flashes and night sweats.  Hormone replacement carries certain risks, especially as you become older. If you are thinking about using estrogen or estrogen with progestin, discuss the benefits and risks with your health care provider.  How can I reduce my risk for heart disease and stroke?  The risk of heart disease, heart attack, and stroke increases as you age. One of the causes may be a change in the body's hormones during menopause. This can affect how your body uses dietary fats, triglycerides, and cholesterol. Heart attack and stroke are medical emergencies. There are many things that you can do to help prevent heart disease and stroke.  Watch your blood pressure  High blood pressure causes heart disease and increases the risk of stroke. This is more likely to develop in people who have high blood pressure readings or are overweight.  Have your blood pressure checked:  Every 3-5 years if you are 18-39 years of age.  Every year if you are 40 years old or older.  Eat a healthy diet  Eat a diet that includes plenty of vegetables, fruits, low-fat dairy products, and lean protein.  Do not eat a lot of foods that are high in solid fats, added sugars, or sodium.  Get regular exercise  Get regular exercise. This is one of the most important things you can do for your health. Most adults should:  Try to exercise for at least 150 minutes each week. The exercise should increase your heart rate and make you sweat (moderate-intensity exercise).  Try to do strengthening exercises at least twice each week. Do these in addition to the moderate-intensity exercise.  Spend less time sitting. Even light physical activity can be beneficial.  Other tips  Work with your health care provider to achieve or maintain a healthy weight.  Do not use any products that contain nicotine or tobacco. These products include  cigarettes, chewing tobacco, and vaping devices, such as e-cigarettes. If you need help quitting, ask your health care provider.  Know your numbers. Ask your health care provider to check your cholesterol and your blood sugar (glucose). Continue to have your blood tested as directed by your health care provider.  Do I need screening for cancer?  Depending on your health history and family history, you may need to have cancer screenings at different stages of your life. This may include screening for:  Breast cancer.  Cervical cancer.  Lung cancer.  Colorectal cancer.  What is my risk for osteoporosis?  After menopause, you may be at increased risk for osteoporosis. Osteoporosis is a condition in which bone destruction happens more quickly than new bone creation. To help prevent osteoporosis or the bone fractures that can happen because of osteoporosis, you may take the following actions:  If you are 19-50 years old, get at least 1,000 mg of calcium and at least 600 international units (IU) of vitamin D per day.  If you are older than age 50 but younger than age 70, get at least 1,200 mg of calcium and at least 600 international units (IU) of vitamin D per day.  If you are older than age 70, get at least 1,200 mg of calcium and at least 800 international units (IU) of vitamin D per day.  Smoking and drinking excessive alcohol increase the risk of osteoporosis. Eat foods that are rich in calcium and vitamin D, and do weight-bearing exercises several times each week as directed by your health care provider.  How does menopause affect my mental health?  Depression may occur at any age, but it is more common as you become older. Common symptoms of depression include:  Feeling depressed.  Changes in sleep patterns.  Changes in appetite or eating patterns.  Feeling an overall lack of motivation or enjoyment of activities that you previously enjoyed.  Frequent crying spells.  Talk with your health care provider if you think  "that you are experiencing any of these symptoms.  General instructions  See your health care provider for regular wellness exams and vaccines. This may include:  Scheduling regular health, dental, and eye exams.  Getting and maintaining your vaccines. These include:  Influenza vaccine. Get this vaccine each year before the flu season begins.  Pneumonia vaccine.  Shingles vaccine.  Tetanus, diphtheria, and pertussis (Tdap) booster vaccine.  Your health care provider may also recommend other immunizations.  Tell your health care provider if you have ever been abused or do not feel safe at home.  Summary  Menopause is a normal process in which your ability to get pregnant comes to an end.  This condition causes hot flashes, night sweats, decreased interest in sex, mood swings, headaches, or lack of sleep.  Treatment for this condition may include hormone replacement therapy.  Take actions to keep yourself healthy, including exercising regularly, eating a healthy diet, watching your weight, and checking your blood pressure and blood sugar levels.  Get screened for cancer and depression. Make sure that you are up to date with all your vaccines.  No Additional Complaints Reported    The following portions of the patient's history were reviewed and updated as appropriate:vital signs, allergies, current medications, past medical history, past social history, past surgical history, and problem list.    Review of Systems  Pertinent items are noted in HPI.     Physical Exam    Objective     /76   Ht 157.5 cm (62.01\")   Wt 50.8 kg (112 lb)   Breastfeeding No   BMI 20.48 kg/m²     General:  well developed; well nourished  no acute distress  mentation appropriate   Constitutional: thin and healthy   Skin:  No suspicious lesions seen  Papule(s) noted on trunk   Thyroid: normal to inspection and palpation   Lungs:  breathing is unlabored  clear to auscultation bilaterally   Heart:  regular rate and rhythm, S1, S2 normal, " no murmur, click, rub or gallop   Breasts:  Examined in supine position  Symmetric without masses or skin dimpling  Nipples normal without inversion, lesions or discharge  There are no palpable axillary nodes   Abdomen: soft, non-tender; no masses  no umbilical or inginual hernias are present  no hepato-splenomegaly   Pelvis: Clinical staff was present for exam  External genitalia:  normal appearance of the external genitalia including Bartholin's and [place]'s glands.  :  urethral meatus normal; urethral hypermobility is absent.  Vaginal:  normal pink mucosa without prolapse or lesions.  Cervix:  normal appearance Pap with screen done  Uterus:  normal size, shape and consistency retroverted;  Adnexa:  normal bimanual exam of the adnexa.   Musculoskeletal: negative   Neuro: normal without focal findings, mental status, speech normal, alert and oriented x3, and STARR   Psych: oriented to time, place and person, mood and affect are within normal limits, pt is a good historian; no memory problems were noted       Lab Review   CBC, CMP, PAP, and FLP    Imaging  DEXA  Mammogram report  colonoscopy    Assessment & Plan     ASSESSMENT  1. Women's annual routine gynecological examination    2. Osteopenia of left hip    3. Rash        PLAN  Orders Placed This Encounter   Procedures    Ambulatory Referral to Dermatology     Referral Priority:   Routine     Referral Type:   Consultation     Referral Reason:   Specialty Services Required     Referred to Provider:   Hollie Parsons MD     Requested Specialty:   Dermatology     Number of Visits Requested:   1     No orders of the defined types were placed in this encounter.        Follow up: 1 year(s)         This note was electronically signed.    Abdoulaye Zuluaga MD  May 1, 2025

## 2025-05-01 ENCOUNTER — OFFICE VISIT (OUTPATIENT)
Dept: OBSTETRICS AND GYNECOLOGY | Facility: CLINIC | Age: 55
End: 2025-05-01
Payer: COMMERCIAL

## 2025-05-01 VITALS
WEIGHT: 112 LBS | DIASTOLIC BLOOD PRESSURE: 76 MMHG | HEIGHT: 62 IN | SYSTOLIC BLOOD PRESSURE: 130 MMHG | BODY MASS INDEX: 20.61 KG/M2

## 2025-05-01 DIAGNOSIS — M85.852 OSTEOPENIA OF LEFT HIP: ICD-10-CM

## 2025-05-01 DIAGNOSIS — Z01.419 WOMEN'S ANNUAL ROUTINE GYNECOLOGICAL EXAMINATION: Primary | ICD-10-CM

## 2025-05-01 DIAGNOSIS — R21 RASH: ICD-10-CM

## 2025-05-02 ENCOUNTER — RESULTS FOLLOW-UP (OUTPATIENT)
Dept: OBSTETRICS AND GYNECOLOGY | Facility: CLINIC | Age: 55
End: 2025-05-02
Payer: COMMERCIAL

## 2025-05-02 ENCOUNTER — PATIENT ROUNDING (BHMG ONLY) (OUTPATIENT)
Dept: OBSTETRICS AND GYNECOLOGY | Facility: CLINIC | Age: 55
End: 2025-05-02
Payer: COMMERCIAL

## 2025-05-02 LAB — REF LAB TEST METHOD: NORMAL

## 2025-05-02 NOTE — PROGRESS NOTES
My name is AimeeRANDY    I am with SARAH HAYNES  [facility]  [address]  [place] [address] [phone]    I want to officially welcome you to our practice and ask about your recent visit.    Tell me about your visit with us.  What things went well?    We're always looking for ways to make our patients' experiences even better.  Do you have recommendations on ways we may improve?    Overall were you satisfied with your first visit to our practice?    I appreciate you taking the time to answer a few questions today.  Is there anything else I can do for you?    Thank you, and have a great day.

## 2025-05-02 NOTE — TELEPHONE ENCOUNTER
----- Message from Abdoulaye Zuluaga sent at 5/2/2025  3:33 PM EDT -----  Regarding: FW:  Please advise patient pap and std screen negative.  ----- Message -----  From: Lab, Background User  Sent: 5/2/2025   1:05 PM EDT  To: Abdoulaye Zuluaga MD

## 2025-05-05 DIAGNOSIS — E78.5 DYSLIPIDEMIA: ICD-10-CM

## 2025-05-05 NOTE — TELEPHONE ENCOUNTER
Caller: [id number] - [place], [place] - [address] [facility] [address] - [phone] [facility] [phone] FX    Relationship: Pharmacy    Best call back number:[phone]    Requested Prescriptions:   Requested Prescriptions     Pending Prescriptions Disp Refills    rosuvastatin (Crestor) 5 MG tablet 90 tablet 3     Sig: Take 1 tablet by mouth Every Night.        Pharmacy where request should be sent:    [facility]  Last office visit with prescribing clinician: 4/3/2025   Last telemedicine visit with prescribing clinician: Visit date not found   Next office visit with prescribing clinician: Visit date not found     Additional details provided by patient: PATIENT IS OUT OF MEDICATION    Does the patient have less than a 3 day supply:  [x] Yes  [] No    Would you like a call back once the refill request has been completed: [] Yes [x] No    If the office needs to give you a call back, can they leave a voicemail: [] Yes [x] No    Reno Reeves Rep   05/05/25 15:01 EDT

## 2025-05-06 RX ORDER — ROSUVASTATIN CALCIUM 5 MG/1
5 TABLET, COATED ORAL NIGHTLY
Qty: 90 TABLET | Refills: 3 | Status: SHIPPED | OUTPATIENT
Start: 2025-05-06

## (undated) DEVICE — ANTIBACTERIAL UNDYED BRAIDED (POLYGLACTIN 910), SYNTHETIC ABSORBABLE SUTURE: Brand: COATED VICRYL

## (undated) DEVICE — DRSNG WND BORDR/ADHS NONADHR/GZ LF 4X4IN STRL

## (undated) DEVICE — SKIN AFFIX SURG ADHESIVE 72/CS 0.55ML: Brand: MEDLINE

## (undated) DEVICE — GLV SURG SENSICARE W/ALOE PF LF 8.5 STRL

## (undated) DEVICE — SPK10295 ORTHOPEDIC FRACTURE KIT: Brand: SPK10295 ORTHOPEDIC FRACTURE KIT

## (undated) DEVICE — Device

## (undated) DEVICE — SYS SKIN CLS DERMABOND PRINEO W/22CM MESH TP

## (undated) DEVICE — BNDG ELAS CO-FLEX SLF ADHR 4IN5YD LF STRL

## (undated) DEVICE — PK MAJ FX HIP 10

## (undated) DEVICE — SUT MNCRYL PLS ANTIB UD 4/0 PS2 18IN

## (undated) DEVICE — DRAPE,TOP,102X53,STERILE: Brand: MEDLINE

## (undated) DEVICE — PAD ARMBRD SURG CONVOL 7.5X20X2IN

## (undated) DEVICE — C-ARM: Brand: UNBRANDED

## (undated) DEVICE — SCRW CANN 16THRD 6.5X90MM
Type: IMPLANTABLE DEVICE | Status: NON-FUNCTIONAL
Removed: 2018-07-04

## (undated) DEVICE — GW THRD TROC/PT 2.8X300MM

## (undated) DEVICE — ENCORE® LATEX MICRO SIZE 8.5, STERILE LATEX POWDER-FREE SURGICAL GLOVE: Brand: ENCORE

## (undated) DEVICE — GOWN,REINF,POLY,ECL,PP SLV,XL: Brand: MEDLINE

## (undated) DEVICE — CVR HNDL LT SURG ACCSSRY BLU STRL

## (undated) DEVICE — DRSNG WND GZ PAD BORDERED 4X8IN STRL